# Patient Record
Sex: FEMALE | Race: BLACK OR AFRICAN AMERICAN | NOT HISPANIC OR LATINO | URBAN - METROPOLITAN AREA
[De-identification: names, ages, dates, MRNs, and addresses within clinical notes are randomized per-mention and may not be internally consistent; named-entity substitution may affect disease eponyms.]

---

## 2018-07-19 ENCOUNTER — EMERGENCY (EMERGENCY)
Facility: HOSPITAL | Age: 23
LOS: 1 days | Discharge: HOME | End: 2018-07-19
Attending: EMERGENCY MEDICINE
Payer: MEDICAID

## 2018-07-19 VITALS
HEIGHT: 70 IN | SYSTOLIC BLOOD PRESSURE: 116 MMHG | WEIGHT: 138.01 LBS | OXYGEN SATURATION: 100 % | TEMPERATURE: 98 F | RESPIRATION RATE: 17 BRPM | DIASTOLIC BLOOD PRESSURE: 79 MMHG | HEART RATE: 77 BPM

## 2018-07-19 DIAGNOSIS — N83.201 UNSPECIFIED OVARIAN CYST, RIGHT SIDE: ICD-10-CM

## 2018-07-19 LAB
ANION GAP SERPL CALC-SCNC: 5 MMOL/L — SIGNIFICANT CHANGE UP (ref 5–17)
APPEARANCE UR: CLEAR — SIGNIFICANT CHANGE UP
BASOPHILS # BLD AUTO: 0.1 K/UL — SIGNIFICANT CHANGE UP (ref 0–0.2)
BASOPHILS NFR BLD AUTO: 1.3 % — SIGNIFICANT CHANGE UP (ref 0–2)
BILIRUB UR-MCNC: NEGATIVE — SIGNIFICANT CHANGE UP
BUN SERPL-MCNC: 11 MG/DL — SIGNIFICANT CHANGE UP (ref 7–18)
CALCIUM SERPL-MCNC: 9.1 MG/DL — SIGNIFICANT CHANGE UP (ref 8.4–10.5)
CHLORIDE SERPL-SCNC: 107 MMOL/L — SIGNIFICANT CHANGE UP (ref 96–108)
CO2 SERPL-SCNC: 26 MMOL/L — SIGNIFICANT CHANGE UP (ref 22–31)
COLOR SPEC: YELLOW — SIGNIFICANT CHANGE UP
CREAT SERPL-MCNC: 0.78 MG/DL — SIGNIFICANT CHANGE UP (ref 0.5–1.3)
DIFF PNL FLD: ABNORMAL
EOSINOPHIL # BLD AUTO: 0.2 K/UL — SIGNIFICANT CHANGE UP (ref 0–0.5)
EOSINOPHIL NFR BLD AUTO: 1.8 % — SIGNIFICANT CHANGE UP (ref 0–6)
GLUCOSE SERPL-MCNC: 88 MG/DL — SIGNIFICANT CHANGE UP (ref 70–99)
GLUCOSE UR QL: NEGATIVE — SIGNIFICANT CHANGE UP
HCG UR QL: NEGATIVE — SIGNIFICANT CHANGE UP
HCT VFR BLD CALC: 43.6 % — SIGNIFICANT CHANGE UP (ref 34.5–45)
HGB BLD-MCNC: 13.7 G/DL — SIGNIFICANT CHANGE UP (ref 11.5–15.5)
KETONES UR-MCNC: ABNORMAL
LACTATE SERPL-SCNC: 1.7 MMOL/L — SIGNIFICANT CHANGE UP (ref 0.7–2)
LEUKOCYTE ESTERASE UR-ACNC: ABNORMAL
LYMPHOCYTES # BLD AUTO: 2.9 K/UL — SIGNIFICANT CHANGE UP (ref 1–3.3)
LYMPHOCYTES # BLD AUTO: 35 % — SIGNIFICANT CHANGE UP (ref 13–44)
MCHC RBC-ENTMCNC: 25.2 PG — LOW (ref 27–34)
MCHC RBC-ENTMCNC: 31.4 GM/DL — LOW (ref 32–36)
MCV RBC AUTO: 80.3 FL — SIGNIFICANT CHANGE UP (ref 80–100)
MONOCYTES # BLD AUTO: 0.4 K/UL — SIGNIFICANT CHANGE UP (ref 0–0.9)
MONOCYTES NFR BLD AUTO: 5.3 % — SIGNIFICANT CHANGE UP (ref 2–14)
NEUTROPHILS # BLD AUTO: 4.7 K/UL — SIGNIFICANT CHANGE UP (ref 1.8–7.4)
NEUTROPHILS NFR BLD AUTO: 56.6 % — SIGNIFICANT CHANGE UP (ref 43–77)
NITRITE UR-MCNC: NEGATIVE — SIGNIFICANT CHANGE UP
PH UR: 7 — SIGNIFICANT CHANGE UP (ref 5–8)
PLATELET # BLD AUTO: 294 K/UL — SIGNIFICANT CHANGE UP (ref 150–400)
POTASSIUM SERPL-MCNC: 3.8 MMOL/L — SIGNIFICANT CHANGE UP (ref 3.5–5.3)
POTASSIUM SERPL-SCNC: 3.8 MMOL/L — SIGNIFICANT CHANGE UP (ref 3.5–5.3)
PROT UR-MCNC: 15
RBC # BLD: 5.44 M/UL — HIGH (ref 3.8–5.2)
RBC # FLD: 13.5 % — SIGNIFICANT CHANGE UP (ref 10.3–14.5)
SODIUM SERPL-SCNC: 138 MMOL/L — SIGNIFICANT CHANGE UP (ref 135–145)
SP GR SPEC: 1.01 — SIGNIFICANT CHANGE UP (ref 1.01–1.02)
UROBILINOGEN FLD QL: NEGATIVE — SIGNIFICANT CHANGE UP
WBC # BLD: 8.4 K/UL — SIGNIFICANT CHANGE UP (ref 3.8–10.5)
WBC # FLD AUTO: 8.4 K/UL — SIGNIFICANT CHANGE UP (ref 3.8–10.5)

## 2018-07-19 PROCEDURE — 76830 TRANSVAGINAL US NON-OB: CPT | Mod: 26

## 2018-07-19 PROCEDURE — 99285 EMERGENCY DEPT VISIT HI MDM: CPT

## 2018-07-19 PROCEDURE — 76856 US EXAM PELVIC COMPLETE: CPT | Mod: 26

## 2018-07-19 PROCEDURE — 99204 OFFICE O/P NEW MOD 45 MIN: CPT

## 2018-07-19 PROCEDURE — 96374 THER/PROPH/DIAG INJ IV PUSH: CPT | Mod: XU

## 2018-07-19 PROCEDURE — 96372 THER/PROPH/DIAG INJ SC/IM: CPT | Mod: XU

## 2018-07-19 PROCEDURE — 74177 CT ABD & PELVIS W/CONTRAST: CPT | Mod: 26

## 2018-07-19 PROCEDURE — 81025 URINE PREGNANCY TEST: CPT

## 2018-07-19 PROCEDURE — 83605 ASSAY OF LACTIC ACID: CPT

## 2018-07-19 PROCEDURE — 81001 URINALYSIS AUTO W/SCOPE: CPT

## 2018-07-19 PROCEDURE — 84702 CHORIONIC GONADOTROPIN TEST: CPT

## 2018-07-19 PROCEDURE — 87591 N.GONORRHOEAE DNA AMP PROB: CPT

## 2018-07-19 PROCEDURE — 87491 CHLMYD TRACH DNA AMP PROBE: CPT

## 2018-07-19 PROCEDURE — 76856 US EXAM PELVIC COMPLETE: CPT

## 2018-07-19 PROCEDURE — 99284 EMERGENCY DEPT VISIT MOD MDM: CPT | Mod: 25

## 2018-07-19 PROCEDURE — 80048 BASIC METABOLIC PNL TOTAL CA: CPT

## 2018-07-19 PROCEDURE — 74177 CT ABD & PELVIS W/CONTRAST: CPT

## 2018-07-19 PROCEDURE — 76830 TRANSVAGINAL US NON-OB: CPT

## 2018-07-19 PROCEDURE — 85027 COMPLETE CBC AUTOMATED: CPT

## 2018-07-19 RX ORDER — ONDANSETRON 8 MG/1
4 TABLET, FILM COATED ORAL ONCE
Qty: 0 | Refills: 0 | Status: COMPLETED | OUTPATIENT
Start: 2018-07-19 | End: 2018-07-19

## 2018-07-19 RX ORDER — SODIUM CHLORIDE 9 MG/ML
1000 INJECTION INTRAMUSCULAR; INTRAVENOUS; SUBCUTANEOUS ONCE
Qty: 0 | Refills: 0 | Status: COMPLETED | OUTPATIENT
Start: 2018-07-19 | End: 2018-07-19

## 2018-07-19 RX ORDER — OXYCODONE HYDROCHLORIDE 5 MG/1
1 TABLET ORAL
Qty: 6 | Refills: 0 | OUTPATIENT
Start: 2018-07-19

## 2018-07-19 RX ORDER — CEFTRIAXONE 500 MG/1
250 INJECTION, POWDER, FOR SOLUTION INTRAMUSCULAR; INTRAVENOUS ONCE
Qty: 0 | Refills: 0 | Status: COMPLETED | OUTPATIENT
Start: 2018-07-19 | End: 2018-07-19

## 2018-07-19 RX ADMIN — CEFTRIAXONE 250 MILLIGRAM(S): 500 INJECTION, POWDER, FOR SOLUTION INTRAMUSCULAR; INTRAVENOUS at 18:29

## 2018-07-19 RX ADMIN — ONDANSETRON 4 MILLIGRAM(S): 8 TABLET, FILM COATED ORAL at 13:42

## 2018-07-19 RX ADMIN — SODIUM CHLORIDE 1000 MILLILITER(S): 9 INJECTION INTRAMUSCULAR; INTRAVENOUS; SUBCUTANEOUS at 13:42

## 2018-07-19 RX ADMIN — SODIUM CHLORIDE 1000 MILLILITER(S): 9 INJECTION INTRAMUSCULAR; INTRAVENOUS; SUBCUTANEOUS at 18:45

## 2018-07-19 RX ADMIN — Medication 100 MILLIGRAM(S): at 18:28

## 2018-07-19 NOTE — ED PROVIDER NOTE - MEDICAL DECISION MAKING DETAILS
23 yo female presenting with abdominal pain, N/V. Patient had episode of emesis shortly after arrival. Abdomen demonstrates some RLQ tenderness without peritoneal signs. Plan for cbc, bmp, lactate, U/A, pregnancy test. Will provide hydration and antiemetics.

## 2018-07-19 NOTE — ED PROVIDER NOTE - PROGRESS NOTE DETAILS
Patient reassessed upon return from CT. She states her pain and her nausea has significantly improved. Patient unable to provide urine sample as she urinated earlier. Will give additional fluids and antiemetics. Spoke with radiologist who states patient's appendix appears normal, but the R ovary is enlarged relative to the left. Patient awaiting transvaginal US to rule out torsion. We will contact OBGYN regarding concern for torsion. Patient signed out to me by Dr. Negrete. Awaiting result of pelvic ultrasound and gyn consult. Patient seen by gyn PA, who will d/w her attending and get back to me. Patient signed out to me by Dr. Negrete. Awaiting result of pelvic ultrasound and gyn consult. Wants patient to get tx for PID. Spoke with gyn SIMON Billy, who d/w Dr. Mauro. They doubt patient has PID but are not opposed to tx. To f/u in gyn clinic in 1 week. Return to the ED immediately if getting worse, not improving, or if having any new or troubling symptoms. Patient resting comfortably, feels moderately improved.

## 2018-07-19 NOTE — CONSULT NOTE ADULT - PROBLEM SELECTOR RECOMMENDATION 9
Cyst is 2.8 cm, stable, VSS - no acute gyn intervention needed at this time  Discharge home with pain meds for 48 hours  patient to follow up at [facility] ([phone]) next week  at this time no need for antibiotics, to follow up in [facility]

## 2018-07-19 NOTE — ED PROVIDER NOTE - OBJECTIVE STATEMENT
23 yo female presenting with 1-2 day history of RLQ abdominal pain, N/V and loose stools. Patient states she is currently on her menses and is having sharp lower abdominal pain. She states she became diaphoretic and nauseous earlier today while getting a haircut. She states she feels generally weak and does not wish to ambulate. She states that a similar pain has happened before during her menses, which resolved. She denies chest pain, SOB, Fevers, chills. She denies headache. She denies vaginal discharge, flank pain, urinary frequency, urgency. Is sexually active.

## 2018-07-19 NOTE — ED PROVIDER NOTE - CERVIX
non tender/ERYTHEMA non tender/ERYTHEMA/No evidence of cervical motion tenderness or cervical friability.

## 2018-07-19 NOTE — CONSULT NOTE ADULT - ASSESSMENT
21 y/o F with small Right ovarian cyst, suspected ruptured cyst following intercourse, currently stable

## 2018-07-19 NOTE — CONSULT NOTE ADULT - SUBJECTIVE AND OBJECTIVE BOX
22y F g0 (LMP ) presents to ED complaining of Right sided abdominal pain that was sudden in onset after intercourse.  Pain is non radiating and continuous, since arriving to ED patient states pain has subsided. Patient also reporting nausea and vomiting and decreased appetite. As per patient she is aware of having a small ovarian cyst on the right side. Denies any fever, chills, constipation/diarrhea, epigastric pain, or  heavy vaginal bleeding.    OB/GYN HISTORY: LMP , hx of chlamydia at age 17 treated; patient currently sexually active with one partner, last std check was 2 months ago which was negative. Was treating yest infection for the last week with monistat  Pmhx: denies  Pshx: denies  ROS negative   Allergies: nkda   meds: denies taking any meds    Vital Signs Last 24 Hrs  T(C): 36.9 (2018 12:49), Max: 36.9 (2018 12:49)  T(F): 98.5 (2018 12:49), Max: 98.5 (2018 12:49)  HR: 77 (2018 12:49) (77 - 77)  BP: 116/79 (2018 12:49) (116/79 - 116/79)  RR: 17 (2018 12:49) (17 - 17)  SpO2: 100% (2018 12:49) (100% - 100%)    PHYSICAL EXAM:  Gen: NAD, a&ox3  Abd: mild amount of Right sided abdominal pain on deep palpation, no guarding, no rebound, neg psoas sign, neg mcburneys tenderness  gyn (performed by ED attending) as per doctor some vaginal discharge, however no cultures collected    LABS:                        13.7   8.4   )-----------( 294      ( 2018 13:33 )             43.6     07-19    138  |  107  |  11  ----------------------------<  88  3.8   |  26  |  0.78    Ca    9.1      2018 13:33      Urinalysis Basic - ( 2018 16:33 )    Color: Yellow / Appearance: Clear / S.010 / pH: x  Gluc: x / Ketone: Trace  / Bili: Negative / Urobili: Negative   Blood: x / Protein: 15 / Nitrite: Negative   Leuk Esterase: Trace / RBC: 0-2 /HPF / WBC 0-2 /HPF   Sq Epi: x / Non Sq Epi: Few /HPF / Bacteria: Trace /HPF    Pregnancy Profile, Urine (18 @ 16:33)    Pregnancy Profile, Urine: Negative    HCG Quantitative, Serum (18 @ 13:33)    HCG Quantitative, Serum: <1:      RADIOLOGY & ADDITIONAL STUDIES:  EXAM:  US TRANSVAGINAL                        EXAM:  US PELVIC COMPLETE                          PROCEDURE DATE:  2018      INTERPRETATION:  CLINICAL INFORMATION: Right lower quadrant abdominal   pain.    LMP: 2018    COMPARISON: Correlation with CT abdomen/pelvis performed on the same day.    TECHNIQUE:     Transabdominal and Endovaginal pelvic sonogram. Color and Spectral   Doppler was performed.    FINDINGS:    Uterus: 8.3 x 4.0 x 5.2 cm. Within normal limits.    Endometrium: 6 mm. Within normal limits.    Right ovary: 4.1 x 3.4 x 3.4 cm., inclusive of a 2.8 x 1.8 x 2.4 cm   complex cyst. Ovarian blood flow is demonstrated utilizing color and   spectral Doppler.    Left ovary: 3.4 x 1.3 x 1.6 cm. Within normal limits. Over blood flow is   demonstrated utilizing color and spectral Doppler.    Fluid: Free fluid within the right adnexa and in the cul-de-sac.    IMPRESSION:    2.8 cm complex right ovarian cyst.    Small free fluid adjacent to the right adnexa and within the cul-de-sac.

## 2018-07-20 LAB
C TRACH RRNA SPEC QL NAA+PROBE: SIGNIFICANT CHANGE UP
N GONORRHOEA RRNA SPEC QL NAA+PROBE: SIGNIFICANT CHANGE UP
SPECIMEN SOURCE: SIGNIFICANT CHANGE UP

## 2019-07-18 ENCOUNTER — EMERGENCY (EMERGENCY)
Facility: HOSPITAL | Age: 24
LOS: 1 days | Discharge: HOME | End: 2019-07-18
Attending: EMERGENCY MEDICINE | Admitting: EMERGENCY MEDICINE
Payer: COMMERCIAL

## 2019-07-18 VITALS
RESPIRATION RATE: 18 BRPM | DIASTOLIC BLOOD PRESSURE: 73 MMHG | OXYGEN SATURATION: 99 % | TEMPERATURE: 98 F | HEART RATE: 70 BPM | SYSTOLIC BLOOD PRESSURE: 118 MMHG | HEIGHT: 69 IN | WEIGHT: 130.07 LBS

## 2019-07-18 DIAGNOSIS — J34.89 OTHER SPECIFIED DISORDERS OF NOSE AND NASAL SINUSES: ICD-10-CM

## 2019-07-18 PROCEDURE — 99283 EMERGENCY DEPT VISIT LOW MDM: CPT | Mod: 25

## 2019-07-18 NOTE — ED ADULT TRIAGE NOTE - CHIEF COMPLAINT QUOTE
pt. c/o feeling swelling and congestion to the back of her nose, states she has polyps there, headaches for "a few weeks". denies cough, fever.

## 2019-07-18 NOTE — ED ADULT NURSE NOTE - OBJECTIVE STATEMENT
Patient came in to ED c/o nasal congestion/pain x 1 week. Per patient she may have "nasal polyps" and has been taking zyrtec with no relief.

## 2019-07-18 NOTE — ED ADULT NURSE NOTE - CHPI ED NUR SYMPTOMS NEG
no nausea/no numbness/no vomiting/no syncope/no chills/no weakness/no fever/no bleeding gums/no loss of consciousness Patient Representative

## 2019-07-18 NOTE — ED ADULT NURSE NOTE - NSIMPLEMENTINTERV_GEN_ALL_ED
Implemented All Universal Safety Interventions:  [place] to call system. Call bell, personal items and telephone within reach. Instruct patient to call for assistance. Room bathroom lighting operational. Non-slip footwear when patient is off stretcher. Physically safe environment: no spills, clutter or unnecessary equipment. Stretcher in lowest position, wheels locked, appropriate side rails in place.

## 2019-07-19 VITALS
TEMPERATURE: 97 F | RESPIRATION RATE: 16 BRPM | DIASTOLIC BLOOD PRESSURE: 74 MMHG | HEART RATE: 71 BPM | SYSTOLIC BLOOD PRESSURE: 108 MMHG | OXYGEN SATURATION: 98 %

## 2019-07-19 PROCEDURE — 99283 EMERGENCY DEPT VISIT LOW MDM: CPT

## 2019-07-19 RX ORDER — PSEUDOEPHEDRINE HCL 30 MG
30 TABLET ORAL ONCE
Refills: 0 | Status: COMPLETED | OUTPATIENT
Start: 2019-07-19 | End: 2019-07-19

## 2019-07-19 RX ADMIN — Medication 1 TABLET(S): at 02:29

## 2019-07-19 RX ADMIN — Medication 500 MILLIGRAM(S): at 02:29

## 2019-07-19 RX ADMIN — Medication 30 MILLIGRAM(S): at 02:29

## 2019-07-19 NOTE — ED PROVIDER NOTE - NSFOLLOWUPINSTRUCTIONS_ED_ALL_ED_FT
Sinusitis is soreness and inflammation of your sinuses. Sinuses are hollow spaces in the bones around your face. Your sinuses are located:  Around your eyes.  In the middle of your forehead.  Behind your nose.  In your cheekbones.  Your sinuses and nasal passages are lined with a stringy fluid (mucus). Mucus normally drains out of your sinuses. When your nasal tissues become inflamed or swollen, mucus can become trapped or blocked. Blocked or trapped mucus makes it difficult for air to flow through your sinuses. This allows bacteria, viruses, and funguses to grow, which leads to infection. Most infections of the sinuses are caused by a virus.    Sinusitis can develop quickly. It can last for 7?10 days (acute) or for more than 12 weeks (chronic). Sinusitis often develops after a cold.    What are the causes?  This condition is caused by anything that creates swelling in the sinuses or stops mucus from draining, including:  Allergies.  Asthma.  Bacterial or viral infection.  Abnormally shaped bones between the nasal passages.  Nasal growths that contain mucus (nasal polyps).  Narrow sinus openings.  Pollutants, such as chemicals or irritants in the air.  A foreign object stuck in the nose.  A fungal infection. This is rare.  What increases the risk?  The following factors may make you more likely to develop this condition:  Having allergies or asthma.  Having had a recent cold or respiratory tract infection.  Having structural deformities or blockages in your nose or sinuses.  Having a weak immune system.  Doing a lot of swimming or diving.  Overusing nasal sprays.  Smoking.  What are the signs or symptoms?  The main symptoms of this condition are pain and a feeling of pressure around the affected sinuses. Other symptoms include:  Upper toothache.  Earache.  Headache.  Bad breath.  Decreased sense of smell and taste.  A cough that may get worse at night.  Fatigue.  Fever.  Thick drainage from your nose. The drainage is often green and it may contain pus (purulent).  Stuffy nose or congestion.  Postnasal drip. This is when extra mucus collects in the throat or back of the nose.  Swelling and warmth over the affected sinuses.  Sore throat.  Sensitivity to light.  How is this diagnosed?  This condition is diagnosed based on symptoms, a medical history, and a physical exam. To find out if your condition is acute or chronic, your health care provider may:  Look in your nose for signs of nasal polyps.  Tap over the affected sinus to check for signs of infection.  View the inside of your sinuses using an imaging device that has a light attached (endoscope).  If your health care provider suspects that you have chronic sinusitis, you may also:  Be tested for allergies.  Have a sample of mucus taken from your nose (nasal culture) and checked for bacteria.  Have a mucus sample examined to see if your sinusitis is related to an allergy.  If your sinusitis does not respond to treatment and it lasts longer than 8 weeks, you may have an MRI or CT scan to check your sinuses. These scans also help to determine how severe your infection is.    In rare cases, a bone biopsy may be done to rule out more serious types of fungal sinus disease.    How is this treated?  Treatment for sinusitis depends on the cause and whether your condition is chronic or acute. If a virus is causing your sinusitis, your symptoms will go away on their own within 10 days. You may be given medicines to relieve your symptoms, including:  Topical nasal decongestants. They shrink swollen nasal passages and let mucus drain from your sinuses.  Antihistamines. These drugs block inflammation that is triggered by allergies. This can help to ease swelling in your nose and sinuses.  Topical nasal corticosteroids. These are nasal sprays that ease inflammation and swelling in your nose and sinuses.  Nasal saline washes. These rinses can help to get rid of thick mucus in your nose.  If your condition is caused by bacteria, your health care provider may recommend waiting to see if your symptoms improve. Most bacterial infections will get better without antibiotic medicine. If you have a severe infection or a weak immune system, you may be prescribed antibiotics.    Surgery may be needed to correct underlying conditions, such as narrow nasal passages. Surgery may also be needed to remove polyps.    Follow these instructions at home:  Medicines     Take, use, or apply over-the-counter and prescription medicines only as told by your health care provider. These may include nasal sprays.  If you were prescribed an antibiotic, take it as told by your health care provider. Do not stop taking the antibiotic even if you start to feel better.  Hydrate and Humidify     Drink enough water to keep your urine clear or pale yellow. Staying hydrated will help to thin your mucus.  Use a cool mist humidifier to keep the humidity level in your home above 50%.  Inhale steam for 10–15 minutes, 3–4 times a day or as told by your health care provider. You can do this in the bathroom while a hot shower is running.  Limit your exposure to cool or dry air.  Rest     Rest as much as possible.  Sleep with your head raised (elevated).  Make sure to get enough sleep each night.  General instructions     Apply a warm, moist washcloth to your face 3–4 times a day or as told by your health care provider. This will help with discomfort.  Wash your hands often with soap and water to reduce your exposure to viruses and other germs. If soap and water are not available, use hand .  Do not smoke. Avoid being around people who are smoking (secondhand smoke).  Keep all follow-up visits as told by your health care provider. This is important.  Contact a health care provider if:  You have a fever.  Your symptoms get worse.  Your symptoms do not improve within 10 days.  Get help right away if:  You have a severe headache.  You have persistent vomiting.  You have pain or swelling around your face or eyes.  You have vision problems.  You develop confusion.  Your neck is stiff.  You have trouble breathing.  This information is not intended to replace advice given to you by your health care provider. Make sure you discuss any questions you have with your health care provider.

## 2019-07-19 NOTE — ED PROVIDER NOTE - OBJECTIVE STATEMENT
23F no PMH c/o sinus congestion. pt states feels a lot of sinus congestion over past few weeks. states foul smelling nasal discharge when she blows her nose.  no sore throat.  pressure to R ear. states has tried OTC sinus relief medications without relief.  no fevers. no HA. no numbness/weakness. no cough. no sick contacts.

## 2019-07-19 NOTE — ED PROVIDER NOTE - NSFOLLOWUPCLINICS_GEN_ALL_ED_FT
[facility]  Otolaryngology (ENT)  [address] [place] [address]  Phone: [phone]  Fax: [phone]  Follow Up Time:

## 2019-07-19 NOTE — ED PROVIDER NOTE - ENMT, MLM
Airway patent, Nasal mucosa clear. Mouth with normal mucosa. Throat has no vesicles, no oropharyngeal exudates and uvula is midline.  TMs clear b/l, no stridor, no trimsus, no intraoral swelling.  +maxillary sinus TTP b/l. no mastoid tenderness

## 2019-07-19 NOTE — ED PROVIDER NOTE - CLINICAL SUMMARY MEDICAL DECISION MAKING FREE TEXT BOX
sinus pressure/pain, ear pressure, no evidence of OM, likely sinusitis, no airway compromise  -augmentin, naproxen  -f/u with ENT  I have discussed the discharge plan with the patient. The patient agrees with the plan, as discussed.  The patient understands Emergency Department diagnosis is a preliminary diagnosis often based on limited information and that the patient must adhere to the follow-up plan as discussed.  The patient understands that if the symptoms worsen or if prescribed medications do not have the desired/planned effect that the patient may return to the Emergency Department at any time for further evaluation and treatment.

## 2020-11-20 ENCOUNTER — EMERGENCY (EMERGENCY)
Facility: HOSPITAL | Age: 25
LOS: 1 days | Discharge: HOME | End: 2020-11-20
Attending: EMERGENCY MEDICINE | Admitting: EMERGENCY MEDICINE
Payer: COMMERCIAL

## 2020-11-20 VITALS
OXYGEN SATURATION: 100 % | HEIGHT: 69 IN | SYSTOLIC BLOOD PRESSURE: 99 MMHG | RESPIRATION RATE: 16 BRPM | WEIGHT: 160.06 LBS | TEMPERATURE: 99 F | HEART RATE: 81 BPM | DIASTOLIC BLOOD PRESSURE: 63 MMHG

## 2020-11-20 VITALS
DIASTOLIC BLOOD PRESSURE: 69 MMHG | TEMPERATURE: 99 F | OXYGEN SATURATION: 98 % | SYSTOLIC BLOOD PRESSURE: 109 MMHG | HEART RATE: 74 BPM | RESPIRATION RATE: 18 BRPM

## 2020-11-20 DIAGNOSIS — Z79.891 LONG TERM (CURRENT) USE OF OPIATE ANALGESIC: ICD-10-CM

## 2020-11-20 DIAGNOSIS — O20.9 HEMORRHAGE IN EARLY PREGNANCY, UNSPECIFIED: ICD-10-CM

## 2020-11-20 DIAGNOSIS — Z3A.01 LESS THAN 8 WEEKS GESTATION OF PREGNANCY: ICD-10-CM

## 2020-11-20 DIAGNOSIS — Z79.1 LONG TERM (CURRENT) USE OF NON-STEROIDAL ANTI-INFLAMMATORIES (NSAID): ICD-10-CM

## 2020-11-20 DIAGNOSIS — Z79.2 LONG TERM (CURRENT) USE OF ANTIBIOTICS: ICD-10-CM

## 2020-11-20 LAB
ALBUMIN SERPL ELPH-MCNC: 4.1 G/DL — SIGNIFICANT CHANGE UP (ref 3.3–5)
ALP SERPL-CCNC: 54 U/L — SIGNIFICANT CHANGE UP (ref 40–120)
ALT FLD-CCNC: 12 U/L — SIGNIFICANT CHANGE UP (ref 10–45)
ANION GAP SERPL CALC-SCNC: 11 MMOL/L — SIGNIFICANT CHANGE UP (ref 5–17)
APPEARANCE UR: CLEAR — SIGNIFICANT CHANGE UP
AST SERPL-CCNC: 15 U/L — SIGNIFICANT CHANGE UP (ref 10–40)
BACTERIA # UR AUTO: ABNORMAL /HPF
BASOPHILS # BLD AUTO: 0.03 K/UL — SIGNIFICANT CHANGE UP (ref 0–0.2)
BASOPHILS NFR BLD AUTO: 0.3 % — SIGNIFICANT CHANGE UP (ref 0–2)
BILIRUB SERPL-MCNC: 0.6 MG/DL — SIGNIFICANT CHANGE UP (ref 0.2–1.2)
BILIRUB UR-MCNC: ABNORMAL
BLD GP AB SCN SERPL QL: NEGATIVE — SIGNIFICANT CHANGE UP
BUN SERPL-MCNC: 14 MG/DL — SIGNIFICANT CHANGE UP (ref 7–23)
CALCIUM SERPL-MCNC: 9.1 MG/DL — SIGNIFICANT CHANGE UP (ref 8.4–10.5)
CHLORIDE SERPL-SCNC: 101 MMOL/L — SIGNIFICANT CHANGE UP (ref 96–108)
CO2 SERPL-SCNC: 22 MMOL/L — SIGNIFICANT CHANGE UP (ref 22–31)
COLOR SPEC: ABNORMAL
COMMENT - URINE: SIGNIFICANT CHANGE UP
CREAT SERPL-MCNC: 0.75 MG/DL — SIGNIFICANT CHANGE UP (ref 0.5–1.3)
DIFF PNL FLD: ABNORMAL
EOSINOPHIL # BLD AUTO: 0.1 K/UL — SIGNIFICANT CHANGE UP (ref 0–0.5)
EOSINOPHIL NFR BLD AUTO: 1.1 % — SIGNIFICANT CHANGE UP (ref 0–6)
EPI CELLS # UR: SIGNIFICANT CHANGE UP /HPF (ref 0–5)
GLUCOSE SERPL-MCNC: 88 MG/DL — SIGNIFICANT CHANGE UP (ref 70–99)
GLUCOSE UR QL: 100
HCG SERPL-ACNC: HIGH MIU/ML
HCT VFR BLD CALC: 37.1 % — SIGNIFICANT CHANGE UP (ref 34.5–45)
HGB BLD-MCNC: 11.6 G/DL — SIGNIFICANT CHANGE UP (ref 11.5–15.5)
IMM GRANULOCYTES NFR BLD AUTO: 0.3 % — SIGNIFICANT CHANGE UP (ref 0–1.5)
KETONES UR-MCNC: ABNORMAL MG/DL
LEUKOCYTE ESTERASE UR-ACNC: NEGATIVE — SIGNIFICANT CHANGE UP
LYMPHOCYTES # BLD AUTO: 1.94 K/UL — SIGNIFICANT CHANGE UP (ref 1–3.3)
LYMPHOCYTES # BLD AUTO: 21.7 % — SIGNIFICANT CHANGE UP (ref 13–44)
MCHC RBC-ENTMCNC: 24.2 PG — LOW (ref 27–34)
MCHC RBC-ENTMCNC: 31.3 GM/DL — LOW (ref 32–36)
MCV RBC AUTO: 77.3 FL — LOW (ref 80–100)
MONOCYTES # BLD AUTO: 0.54 K/UL — SIGNIFICANT CHANGE UP (ref 0–0.9)
MONOCYTES NFR BLD AUTO: 6 % — SIGNIFICANT CHANGE UP (ref 2–14)
NEUTROPHILS # BLD AUTO: 6.32 K/UL — SIGNIFICANT CHANGE UP (ref 1.8–7.4)
NEUTROPHILS NFR BLD AUTO: 70.6 % — SIGNIFICANT CHANGE UP (ref 43–77)
NITRITE UR-MCNC: POSITIVE
NRBC # BLD: 0 /100 WBCS — SIGNIFICANT CHANGE UP (ref 0–0)
PH UR: 6.5 — SIGNIFICANT CHANGE UP (ref 5–8)
PLATELET # BLD AUTO: 317 K/UL — SIGNIFICANT CHANGE UP (ref 150–400)
POTASSIUM SERPL-MCNC: 4.3 MMOL/L — SIGNIFICANT CHANGE UP (ref 3.5–5.3)
POTASSIUM SERPL-SCNC: 4.3 MMOL/L — SIGNIFICANT CHANGE UP (ref 3.5–5.3)
PROT SERPL-MCNC: 7 G/DL — SIGNIFICANT CHANGE UP (ref 6–8.3)
PROT UR-MCNC: >=300 MG/DL
RBC # BLD: 4.8 M/UL — SIGNIFICANT CHANGE UP (ref 3.8–5.2)
RBC # FLD: 14.6 % — HIGH (ref 10.3–14.5)
RBC CASTS # UR COMP ASSIST: ABNORMAL /HPF
RH IG SCN BLD-IMP: POSITIVE — SIGNIFICANT CHANGE UP
SODIUM SERPL-SCNC: 134 MMOL/L — LOW (ref 135–145)
SP GR SPEC: >=1.03 — SIGNIFICANT CHANGE UP (ref 1–1.03)
UROBILINOGEN FLD QL: 1 E.U./DL — SIGNIFICANT CHANGE UP
WBC # BLD: 8.96 K/UL — SIGNIFICANT CHANGE UP (ref 3.8–10.5)
WBC # FLD AUTO: 8.96 K/UL — SIGNIFICANT CHANGE UP (ref 3.8–10.5)
WBC UR QL: < 5 /HPF — SIGNIFICANT CHANGE UP

## 2020-11-20 PROCEDURE — 86901 BLOOD TYPING SEROLOGIC RH(D): CPT

## 2020-11-20 PROCEDURE — 87184 SC STD DISK METHOD PER PLATE: CPT

## 2020-11-20 PROCEDURE — 99284 EMERGENCY DEPT VISIT MOD MDM: CPT | Mod: 25

## 2020-11-20 PROCEDURE — 87086 URINE CULTURE/COLONY COUNT: CPT

## 2020-11-20 PROCEDURE — 96361 HYDRATE IV INFUSION ADD-ON: CPT

## 2020-11-20 PROCEDURE — 76817 TRANSVAGINAL US OBSTETRIC: CPT

## 2020-11-20 PROCEDURE — 36415 COLL VENOUS BLD VENIPUNCTURE: CPT

## 2020-11-20 PROCEDURE — 86850 RBC ANTIBODY SCREEN: CPT

## 2020-11-20 PROCEDURE — 80053 COMPREHEN METABOLIC PANEL: CPT

## 2020-11-20 PROCEDURE — 81001 URINALYSIS AUTO W/SCOPE: CPT

## 2020-11-20 PROCEDURE — 96374 THER/PROPH/DIAG INJ IV PUSH: CPT

## 2020-11-20 PROCEDURE — 85025 COMPLETE CBC W/AUTO DIFF WBC: CPT

## 2020-11-20 PROCEDURE — 76801 OB US < 14 WKS SINGLE FETUS: CPT

## 2020-11-20 PROCEDURE — 99285 EMERGENCY DEPT VISIT HI MDM: CPT

## 2020-11-20 PROCEDURE — 96375 TX/PRO/DX INJ NEW DRUG ADDON: CPT

## 2020-11-20 PROCEDURE — 76801 OB US < 14 WKS SINGLE FETUS: CPT | Mod: 26

## 2020-11-20 PROCEDURE — 84702 CHORIONIC GONADOTROPIN TEST: CPT

## 2020-11-20 PROCEDURE — 76817 TRANSVAGINAL US OBSTETRIC: CPT | Mod: 26

## 2020-11-20 PROCEDURE — 86900 BLOOD TYPING SEROLOGIC ABO: CPT

## 2020-11-20 RX ORDER — KETOROLAC TROMETHAMINE 30 MG/ML
30 SYRINGE (ML) INJECTION ONCE
Refills: 0 | Status: DISCONTINUED | OUTPATIENT
Start: 2020-11-20 | End: 2020-11-20

## 2020-11-20 RX ORDER — ONDANSETRON 8 MG/1
4 TABLET, FILM COATED ORAL ONCE
Refills: 0 | Status: COMPLETED | OUTPATIENT
Start: 2020-11-20 | End: 2020-11-20

## 2020-11-20 RX ORDER — SODIUM CHLORIDE 9 MG/ML
1000 INJECTION INTRAMUSCULAR; INTRAVENOUS; SUBCUTANEOUS ONCE
Refills: 0 | Status: COMPLETED | OUTPATIENT
Start: 2020-11-20 | End: 2020-11-20

## 2020-11-20 RX ADMIN — SODIUM CHLORIDE 1000 MILLILITER(S): 9 INJECTION INTRAMUSCULAR; INTRAVENOUS; SUBCUTANEOUS at 14:40

## 2020-11-20 RX ADMIN — ONDANSETRON 4 MILLIGRAM(S): 8 TABLET, FILM COATED ORAL at 14:40

## 2020-11-20 RX ADMIN — Medication 30 MILLIGRAM(S): at 16:29

## 2020-11-20 RX ADMIN — SODIUM CHLORIDE 1000 MILLILITER(S): 9 INJECTION INTRAMUSCULAR; INTRAVENOUS; SUBCUTANEOUS at 15:39

## 2020-11-20 RX ADMIN — Medication 30 MILLIGRAM(S): at 17:00

## 2020-11-20 NOTE — ED ADULT NURSE NOTE - OBJECTIVE STATEMENT
LMP Oct. , 6 wks pregnant, c/o of abdominal cramping pain w/ episodes of nausea and vomitting, vaginal bleed started 1 hour ago. [facility] and [facility]stered [facility]ol.

## 2020-11-20 NOTE — ED ADULT NURSE REASSESSMENT NOTE - NS ED NURSE REASSESS COMMENT FT1
Patient a/ox 3, symptoms improved, no c/o of abdominal cramping pain, vital sgins stable.  All labs, US resulted.  Seen by OB GYNE.  Discharged to home in stable condition.

## 2020-11-20 NOTE — ED PROVIDER NOTE - OBJECTIVE STATEMENT
25 F  LMP 10/7 p/w vaginal bleeding and pelvic cramping s/p medical  (unknown pill) yesterday given at [facility].  pt reports she h 25 F  LMP 10/7 approx 7 weeks preg p/w vaginal bleeding and pelvic cramping s/p medical  (unknown pill) yesterday given at [facility].  pt reports she had bedside sonogram yesterday confirming IUP then was given pill for elective .  This morning she started to experience heavy bleeding w/ clots, changed pad once and sharp lower abd cramping.  Pt w/ few episodes nbnb emesis and feeling lightheaded- no fainting.  Denies f/c, headache, chest pain, sob, cough, uri sxs, nvd, urinary sxs, numbness/weakness, trauma

## 2020-11-20 NOTE — ED PROVIDER NOTE - PATIENT PORTAL LINK FT
You can access the FollowMyHealth Patient Portal offered by [facility] by registering at the following website: [email or web address]. By joining [facility]’s FollowMyHealth portal, you will also be able to view your health information using other applications (apps) compatible with our system.

## 2020-11-20 NOTE — ED PROVIDER NOTE - ATTENDING CONTRIBUTION TO CARE
25F  approx 10wks pregnant here w/ vaginal bleeding/pelvic cramping after medical  yesterday at [facility]. VSS, low abdominal tenderness. plan for pain control, nausea control, check hemoglobin. likely symptoms are 2/2 expected  after getting medical ab pill yesterday, however pt extremely anxious 25F  approx 7wks pregnant here w/ vaginal bleeding/pelvic cramping after medical  yesterday at [facility]. VSS, low abdominal tenderness. plan for pain control, nausea control, check hemoglobin. likely symptoms are 2/2 expected  after getting medical ab pill yesterday, however pt extremely anxious

## 2020-11-20 NOTE — ED PROVIDER NOTE - CLINICAL SUMMARY MEDICAL DECISION MAKING FREE TEXT BOX
25 F  LMP 10/7 approx 7 weeks preg p/w vaginal bleeding and pelvic cramping s/p medical  (unknown pill) yesterday given at [facility].  pt anxious appearing, +lower abd ttp and pelvic w/ pooling blood/clots. labs w/ stable h/h, Rh +, hcg quant [id number].  sonogram consistent with complete - no evidence IUP/extrauterine preg or retained poc.   pt improved s/p IVF, toradol, zofran.  pt afebrile, tolerating PO.  pt instructed [facility] rpt hcg and f/u.  discussed strict return parameters

## 2020-11-20 NOTE — ED PROVIDER NOTE - PHYSICAL EXAMINATION
Vitals reviewed  Gen: anxious appearing, nad, speaking in full sentences  Skin: wwp, no rash/lesions  HEENT: ncat, eomi, mmm  CV: rrr, no audible m/r/g  Resp: symmetrical expansion, ctab, no w/r/r  Abd: nondistended, soft, diffuse lower abd ttp, no rebound/guarding, no cvat  Pelvic: + pooling blood w/ few clots expelled on exam, os open, b/l adnexal and uterine ttp  Ext: FROM throughout, no peripheral edema  Neuro: alert/oriented, no focal deficits, steady gait

## 2020-11-20 NOTE — ED PROVIDER NOTE - NSFOLLOWUPINSTRUCTIONS_ED_ALL_ED_FT
Take tylenol 650mg or motrin 400-800mg as needed every 4-6 hours for pain.   [facility]- take results with you. Return to ED if you experience fever, dizziness, fainting, difficulty breathing, increased/heavier bleeding, vomiting or other concerns    Prostaglandin-Induced , Care After      This sheet gives you information about how to care for yourself after your procedure. Your health care provider may also give you more specific instructions. If you have problems or questions, contact your health care provider.      What can I expect after the procedure?  After the procedure, it is common to have:  •Bleeding that lasts for a few hours or a few days. It may feel like you are having a heavy menstrual period.      •A headache.      •Diarrhea.      •Nausea and vomiting.      •Chills.      •Dizziness.      Your next period will most likely start 4–6 weeks after the procedure, unless you start taking birth control pills.      Follow these instructions at home:      Medicines      •Take over-the-counter and prescription medicines only as told by your health care provider.      •Only take the medicines your health care provider recommends. Do not take aspirin. It can cause bleeding.      Activity     • Do not have sex for 2–3 weeks or until your health care provider approves.      •Rest and avoid activity that requires a lot of energy for 2–3 weeks.      • Do not drive or use heavy machinery while taking prescription pain medicine.      General instructions   •There will be bleeding after the procedure. It is recommended that you:  •Write down how many menstrual pads you use each day and how soaked they are. This could be useful information for your health care provider.      •Check for any large blood clots or tissue when you change your menstrual pad. If you pass tissue, save the tissue to show to your health care provider.        • Do not douche or use tampons until your health care provider approves.      •Ask your health care provider when you can start using hormonal birth control (contraception).      •Keep all follow-up visits as told by your health care provider. This is important.        Contact a health care provider if:    •You have chills or a fever.      •You have pain that is not relieved by prescription pain medicine.      •You have a bad-smelling vaginal discharge.      •You have pain or bleeding that gets worse instead of better.    •You have any of the following for more than 24 hours:  •Nausea.      •Vomiting.      •Diarrhea.          Get help right away if:    •You have severe cramps in your stomach, back, or abdomen.      •You pass large blood clots or tissue out of your vagina. Save any tissue for your health care provider to inspect.      •You need to change your pad more than once in an hour.      •You become light-headed, weak, or faint.        Summary    •After the procedure it is common to have bleeding, headache, diarrhea, nausea and vomiting, chills, and dizziness.      •Take over-the-counter and prescription medicines only as told by your health care provider.      •Do not have sex for 2–3 weeks or until your health care provider approves.      •When you are bleeding after the procedure, write down how many menstrual pads you use each day and how soaked they are.      •Keep all follow-up visits as told by your health care provider. This is important.      This information is not intended to replace advice given to you by your health care provider. Make sure you discuss any questions you have with your health care provider.

## 2020-11-23 LAB
-  AMPICILLIN: SIGNIFICANT CHANGE UP
-  CLINDAMYCIN: SIGNIFICANT CHANGE UP
-  ERYTHROMYCIN: SIGNIFICANT CHANGE UP
-  LEVOFLOXACIN: SIGNIFICANT CHANGE UP
-  PENICILLIN: SIGNIFICANT CHANGE UP
-  VANCOMYCIN: SIGNIFICANT CHANGE UP
CULTURE RESULTS: SIGNIFICANT CHANGE UP
METHOD TYPE: SIGNIFICANT CHANGE UP
ORGANISM # SPEC MICROSCOPIC CNT: SIGNIFICANT CHANGE UP
ORGANISM # SPEC MICROSCOPIC CNT: SIGNIFICANT CHANGE UP
SPECIMEN SOURCE: SIGNIFICANT CHANGE UP

## 2020-11-24 RX ORDER — CEPHALEXIN 500 MG
1 CAPSULE ORAL
Qty: 20 | Refills: 0
Start: 2020-11-24 | End: 2020-11-28

## 2021-08-03 NOTE — ED PROVIDER NOTE - GENITOURINARY NEGATIVE STATEMENT, MLM
[Joint Pain] : joint pain [Negative] : Heme/Lymph [Fever] : no fever [Chills] : no chills [Cough] : no cough [SOB on Exertion] : no shortness of breath on exertion no dysuria, no frequency, and no hematuria.

## 2022-04-23 ENCOUNTER — EMERGENCY (EMERGENCY)
Facility: HOSPITAL | Age: 27
LOS: 1 days | Discharge: HOME | End: 2022-04-23
Admitting: EMERGENCY MEDICINE
Payer: COMMERCIAL

## 2022-04-23 VITALS
OXYGEN SATURATION: 97 % | TEMPERATURE: 98 F | SYSTOLIC BLOOD PRESSURE: 108 MMHG | RESPIRATION RATE: 18 BRPM | HEIGHT: 69 IN | HEART RATE: 95 BPM | WEIGHT: 182.98 LBS | DIASTOLIC BLOOD PRESSURE: 72 MMHG

## 2022-04-23 DIAGNOSIS — M25.571 PAIN IN RIGHT ANKLE AND JOINTS OF RIGHT FOOT: ICD-10-CM
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
## 2022-04-23 DIAGNOSIS — Z86.19 PERSONAL HISTORY OF OTHER INFECTIOUS AND PARASITIC DISEASES: ICD-10-CM

## 2022-04-23 DIAGNOSIS — R39.15 URGENCY OF URINATION: ICD-10-CM

## 2022-04-23 DIAGNOSIS — R10.2 PELVIC AND PERINEAL PAIN: ICD-10-CM

## 2022-04-23 DIAGNOSIS — S93.401A SPRAIN OF UNSPECIFIED LIGAMENT OF RIGHT ANKLE, INITIAL ENCOUNTER: ICD-10-CM

## 2022-04-23 LAB
APPEARANCE UR: CLEAR — SIGNIFICANT CHANGE UP
BACTERIA # UR AUTO: PRESENT /HPF
BILIRUB UR-MCNC: NEGATIVE — SIGNIFICANT CHANGE UP
COLOR SPEC: YELLOW — SIGNIFICANT CHANGE UP
DIFF PNL FLD: NEGATIVE — SIGNIFICANT CHANGE UP
EPI CELLS # UR: ABNORMAL /HPF (ref 0–5)
GLUCOSE UR QL: NEGATIVE — SIGNIFICANT CHANGE UP
KETONES UR-MCNC: NEGATIVE — SIGNIFICANT CHANGE UP
LEUKOCYTE ESTERASE UR-ACNC: ABNORMAL
NITRITE UR-MCNC: NEGATIVE — SIGNIFICANT CHANGE UP
PH UR: 5.5 — SIGNIFICANT CHANGE UP (ref 5–8)
PROT UR-MCNC: NEGATIVE MG/DL — SIGNIFICANT CHANGE UP
SP GR SPEC: >=1.03 — SIGNIFICANT CHANGE UP (ref 1–1.03)
UROBILINOGEN FLD QL: 0.2 E.U./DL — SIGNIFICANT CHANGE UP
WBC UR QL: ABNORMAL /HPF

## 2022-04-23 PROCEDURE — 81001 URINALYSIS AUTO W/SCOPE: CPT

## 2022-04-23 PROCEDURE — 73610 X-RAY EXAM OF ANKLE: CPT | Mod: 26,RT

## 2022-04-23 PROCEDURE — 96372 THER/PROPH/DIAG INJ SC/IM: CPT

## 2022-04-23 PROCEDURE — 73610 X-RAY EXAM OF ANKLE: CPT

## 2022-04-23 PROCEDURE — 99284 EMERGENCY DEPT VISIT MOD MDM: CPT | Mod: 25

## 2022-04-23 PROCEDURE — 87086 URINE CULTURE/COLONY COUNT: CPT

## 2022-04-23 PROCEDURE — 87591 N.GONORRHOEAE DNA AMP PROB: CPT

## 2022-04-23 PROCEDURE — 87491 CHLMYD TRACH DNA AMP PROBE: CPT

## 2022-04-23 PROCEDURE — 73610 X-RAY EXAM OF ANKLE: CPT | Mod: 26

## 2022-04-23 RX ORDER — METRONIDAZOLE 500 MG
500 TABLET ORAL ONCE
Refills: 0 | Status: COMPLETED | OUTPATIENT
Start: 2022-04-23 | End: 2022-04-23

## 2022-04-23 RX ORDER — IBUPROFEN 200 MG
600 TABLET ORAL ONCE
Refills: 0 | Status: COMPLETED | OUTPATIENT
Start: 2022-04-23 | End: 2022-04-23

## 2022-04-23 RX ORDER — METRONIDAZOLE 500 MG
1 TABLET ORAL
Qty: 14 | Refills: 0
Start: 2022-04-23 | End: 2022-04-29

## 2022-04-23 RX ORDER — CEFTRIAXONE 500 MG/1
500 INJECTION, POWDER, FOR SOLUTION INTRAMUSCULAR; INTRAVENOUS ONCE
Refills: 0 | Status: COMPLETED | OUTPATIENT
Start: 2022-04-23 | End: 2022-04-23

## 2022-04-23 RX ADMIN — Medication 600 MILLIGRAM(S): at 16:18

## 2022-04-23 RX ADMIN — Medication 500 MILLIGRAM(S): at 17:10

## 2022-04-23 RX ADMIN — Medication 100 MILLIGRAM(S): at 17:10

## 2022-04-23 RX ADMIN — CEFTRIAXONE 500 MILLIGRAM(S): 500 INJECTION, POWDER, FOR SOLUTION INTRAMUSCULAR; INTRAVENOUS at 17:10

## 2022-04-23 NOTE — ED PROVIDER NOTE - CLINICAL SUMMARY MEDICAL DECISION MAKING FREE TEXT BOX
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  on exam pt appearing comfortable, Abd: nondistended, soft, nontender, no rebound/guarding, no cvat;  Pelvic: copious grey discharge, no bleeding, no cmt, no adnexal mass or ttp;  Ext: + swelling/ecchymosis over R ankle w/ ttp over lateral malleolus w/ limited ROM no prox tib/fib ttp, otherwise FROM all other ext, no peripheral edema, DP/PT and radial pulses 2+, SILT.  upreg neg, UA contaminated, cult sent, will treat for STI/BV based on exam w/ rocephin/doxy/flagyl.  R ankle xray shows no e/o fracture or dislocation.  ankle sprain, will give air cast, crutches for WBAT and educated on RICE.  can f/u outpt w/ GYN and ortho.  discussed strict return parameters

## 2022-04-23 NOTE — ED PROVIDER NOTE - NSFOLLOWUPINSTRUCTIONS_ED_ALL_ED_FT
Take tylenol 650mg or motrin 400-800mg as needed every 4-6 hours for pain.   REST- Rest your hurting/injured joint or extremity to decrease pain and swelling for 24-48 hours    ICE- Apply ice to area of pain to decreased inflammation and pain, put towel/barrier between ice and skin. You can keep ice on for 20 minutes at a time 4-8 times daily   COMPRESSION- Wear ace wrap or brace for support to reduce swelling.  Make sure not to wrap too tight, loosen if skin feeling numb/tingling or skin turns blue   ELEVATION- Elevate hurting/injured area 6 or more inches about level of heart to decrease swelling/inflammation.  Use pillow under joint to elevate area    Make sure to take full course of antibiotics for pelvic infection as prescribed.  We will call you with STD and urine culture results if anything comes back abnormal.     Follow up with GYN doctor and orthopedic specialist within one week  [facility] (Thursday afternoons) - call [phone] to schedule    You may call our referrals coordinator at [phone] Monday to Friday 11am-7pm for assistance with making an appointment      A sexually transmitted disease (STD) is a disease or infection that may be passed (transmitted) from person to person, usually during sexual activity. This may happen by way of saliva, semen, blood, vaginal mucus, or urine. Symptoms vary depending on the type of STD acquired and may include pain in the groin, discharge, and lesions or a rash. If you are started on an antibiotic, take it exactly as instructed. Avoid sexual contact of any kind until cleared by a health care professional. Contact your sexual partner(s) to inform them of your diagnosis so that they may be tested and treated as well.    SEEK IMMEDIATE MEDICAL CARE IF YOU HAVE ANY OF THE FOLLOWING SYMPTOMS: severe abdominal pain, high fever, nausea/vomiting, or unintended weight loss.      Ankle Sprain       An ankle sprain is a stretch or tear in a ligament in the ankle. Ligaments are tissues that connect bones to each other.    The two most common types of ankle sprains are:  •Inversion sprain. This happens when the foot turns inward and the ankle rolls outward. It affects the ligament on the outside of the foot (lateral ligament).      •Eversion sprain. This happens when the foot turns outward and the ankle rolls inward. It affects the ligament on the inner side of the foot (medial ligament).        What are the causes?    This condition is often caused by accidentally rolling or twisting the ankle.      What increases the risk?    You are more likely to develop this condition if you play sports.      What are the signs or symptoms?     Symptoms of this condition include:  •Pain in your ankle.      •Swelling.      •Bruising. This may develop right after you sprain your ankle or 1–2 days later.      •Trouble standing or walking, especially when you turn or change directions.        How is this diagnosed?    This condition is diagnosed with:  •A physical exam. During the exam, your health care provider will press on certain parts of your foot and ankle and try to move them in certain ways.      •X-ray imaging. These may be taken to see how severe the sprain is and to check for broken bones.        How is this treated?    This condition may be treated with:  •A brace or splint. This is used to keep the ankle from moving until it heals.      •An elastic bandage. This is used to support the ankle.      •Crutches.      •Pain medicine.      •Surgery. This may be needed if the sprain is severe.      •Physical therapy. This may help to improve the range of motion in the ankle.        Follow these instructions at home:    If you have a brace or a splint:     •Wear the brace or splint as told by your health care provider. Remove it only as told by your health care provider.      •Loosen the brace or splint if your toes tingle, become numb, or turn cold and blue.      •Keep the brace or splint clean.    •If the brace or splint is not waterproof:  •Do not let it get wet.       •Cover it with a watertight covering when you take a bath or a shower.        If you have an elastic bandage (dressing):     •Remove it to shower or bathe.      •Try not to move your ankle much, but wiggle your toes from time to time. This helps to prevent swelling.      •Adjust the dressing to make it more comfortable if it feels too tight.      •Loosen the dressing if you have numbness or tingling in your foot, or if your foot becomes cold and blue.        Managing pain, stiffness, and swelling      •Take over-the-counter and prescription medicines only as told by your health care provider.      •For 2–3 days, keep your ankle raised (elevated) above the level of your heart as much as possible.    •If directed, put ice on the injured area:  •If you have a removable brace or splint, remove it as told by your health care provider.      •Put ice in a plastic bag.      •Place a towel between your skin and the bag.      •Leave the ice on for 20 minutes, 2–3 times a day.        General instructions     •Rest your ankle.      • Do not use the injured limb to support your body weight until your health care provider says that you can. Use crutches as told by your health care provider.      • Do not use any products that contain nicotine or tobacco, such as cigarettes, e-cigarettes, and chewing tobacco. If you need help quitting, ask your health care provider.      •Keep all follow-up visits as told by your health care provider. This is important.        Contact a health care provider if:    •You have rapidly increasing bruising or swelling.      •Your pain is not relieved with medicine.        Get help right away if:    •Your foot or toes become numb or blue.      •You have severe pain that gets worse.        Summary    •An ankle sprain is a stretch or tear in a ligament in the ankle. Ligaments are tissues that connect bones to each other.      •This condition is often caused by accidentally rolling or twisting the ankle.      •Symptoms include pain, swelling, bruising, and trouble walking.      •To relieve pain and swelling, put ice on the affected ankle, raise your ankle above the level of your heart, and use an elastic bandage.      •Keep all follow-up visits as told by your health care provider. This is important.      This information is not intended to replace advice given to you by your health care provider. Make sure you discuss any questions you have with your health care provider.

## 2022-04-23 NOTE — ED PROVIDER NOTE - WR INTERPRETATION 1
MSK XR negative - No fracture, No dislocation, No foreign body, anmol island inferior to distal tibia

## 2022-04-23 NOTE — ED ADULT NURSE NOTE - OBJECTIVE STATEMENT
26y otherwise healthy female BIBA c/o ankle pain/injury. states she twisted her ankle outwards while going up stairs PTA. able to bear weight w/ pain. swelling noted to medial ankle. tender on palpation. denies numbness/tingling. No acute distress noted at this time.

## 2022-04-23 NOTE — ED PROVIDER NOTE - PATIENT PORTAL LINK FT
You can access the FollowMyHealth Patient Portal offered by [facility] by registering at the following website: [email or web address] By joining [facility]’s FollowMyHealth portal, you will also be able to view your health information using other applications (apps) compatible with our system.

## 2022-04-23 NOTE — ED ADULT TRIAGE NOTE - CHIEF COMPLAINT QUOTE
Pt BIBA after tripping while walking upstairs, endorses right ankle pain. No deformity noted, pts states she was unable to bear weight.

## 2022-04-23 NOTE — ED PROVIDER NOTE - PHYSICAL EXAMINATION
Vitals reviewed  Gen: comfortable appearing, nad, speaking in full sentences  Skin: wwp, no rash/lesions  HEENT: ncat, eomi, mmm  CV: rrr, no audible m/r/g  Resp: symmetrical expansion, ctab, no w/r/r  Abd: nondistended, soft, nontender, no rebound/guarding, no cvat  Pelvic: copious grey discharge, no bleeding, no cmt, no adnexal mass or ttp   Ext: + swelling/ecchymosis over R ankle w/ ttp over lateral malleolus w/ limited ROM no prox tib/fib ttp, otherwise FROM all other ext, no peripheral edema, DP/PT and radial pulses 2+, SILT  Neuro: alert/oriented, no focal deficits

## 2022-04-23 NOTE — ED PROVIDER NOTE - OBJECTIVE STATEMENT
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  pt reports she was coming to ED for pelvic pain when she twisted R ankle and now w/ pain/swelling, unable to bear weight.  Also reports intermittent diffuse lower pelvic pain x 2 mons, last 2 mons menses only 2 days (typically 5+ days).  + increased urination, no hematuria/dysuria.  no vaginal discharge, states not sexually active for past 2 mons, prior was not using protection.  denies any recent HSV outbreaks.  denies f/c, headache, dizziness, neck/back pain, subsequent fall, chest pain, sob, nvd, other concerns

## 2022-04-24 LAB
CULTURE RESULTS: SIGNIFICANT CHANGE UP
SPECIMEN SOURCE: SIGNIFICANT CHANGE UP

## 2022-08-29 NOTE — ED PROVIDER NOTE - PROGRESS NOTE ADDITIONAL2
8/29/2022   Chief Complaint   Patient presents with    ED Follow-up     ED vollow up visit from 8.25.22, right knee         History of Present Illness:                             Félix Ng is a 72 y.o. female referred by emergency room for evaluation and treatment of right knee pain. Patient was seen in the emergency room 4 days prior where she had a syncopal episode and had a fall onto her right knee. She has been having pain at the right knee joint since the fall but states she has a history of tibial shaft pain since her most recent revision knee replacement in 2019. She states that the tibial shaft pain is not worsened since the fall. She has no complaints of instability and no specific quad pain. She is able to ambulate but utilizes a cane which is normal for her. No advanced imaging thus far this my first time seeing the patient. Patient underwent primary total knee replacement in 2013 but then underwent a revision in 2016 due to instability. She states that she had an additional revision knee replacement as 20 and a larger polywas placed that time per the patient. She states that she has had continued shin pain since then but no additional complications and no history of any type of infection. She denies any constitutional symptoms today. The pain's location is diffuse throughout right knee. she describes the symptoms as aching, sharp and stabbing. Symptoms improve with rest. Symptoms worsen with deep knee bending, getting up from a chair, weight bearing, sitting for prolonged periods of time, twisting activities. Patient denies numbness, tingling, fever, chills. Denies swelling or effusions. No prominent mechanical symptoms. Denies instability. Worse with increased activity. Better with rest.    Treatment thus far has included rest, activity modifications, oral medications without significant relief. Here today to discuss diagnosis and treatment options.       Is affecting ADLs. Pain is 10/10 at it's worst.    No advanced imaging thus far    Outside reports reviewed: Outside imaging and note reviewed    Patient's medications, allergies, past medical, surgical, social and family histories were reviewed and updated as appropriate. MA HPI: Patient is here for follow up on ED visit on 8.25.22. (adm date) Patient denies new injury since ED visit. Patient rates pain 9/10 in right knee. Patient has taken Rx percocet for pain. Patient denies numbness and tingling in right leg. Patient states she thinks she passed out due to her BP medication. Xrays performed in ED, impression as follows    Medical History  Patient's medications, allergies, past medical, surgical, social and family histories were reviewed and updated as appropriate.     Past Medical History:   Diagnosis Date    Arthritis     Chronic kidney disease     COPD (chronic obstructive pulmonary disease) (HCC)     Diabetes mellitus ([facility]     Diabetic eye exam ([facility] 5-27-16    No diabetic retinopathy-Dr. Tra Flores    Emphysema     History of bone density study 04/08/2016    Osteopenia    Hyperlipidemia     Hypertension     Screening mammogram, encounter for 02/03/2016    Stable Mammographic-no evidence of malignancy    Systolic congestive heart failure ([facility] 7/28/2021     Past Surgical History:   Procedure Laterality Date    CARPAL TUNNEL RELEASE Bilateral     CATARACT REMOVAL Right 06/12/2018    per GERARDO Stalpes, [address] (CERVIX STATUS UNKNOWN)      JOINT REPLACEMENT      NECK SURGERY      PAIN MANAGEMENT PROCEDURE Left 2/11/2021    RADIOFREQUENCY ABLATION LMB LEFT L4 & 5 performed by Caleb Baum MD at [place] [address] 2/16/2022    GASTRECTOMY SLEEVE LAPAROSCOPIC ROBOTIC performed by Luis La MD at [address] [place] [address] Road 9/14/2021    EGD BIOPSY performed by Saloni Monaco MD at [address] Right 8/15/2022     BREAST NEEDLE BIOPSY RIGHT 8/15/2022 Coreen Mathur MD [facility] [address] [facility]     Family History   Problem Relation Age of Onset    Cancer Mother     Diabetes Mother     High Blood Pressure Mother     High Cholesterol Mother     Stroke Father     High Blood Pressure Father     High Cholesterol Father     Cancer Sister         Breast    Diabetes Sister     Heart Disease Sister     High Blood Pressure Sister     High Cholesterol Sister     Breast Cancer Sister     Diabetes Brother     Heart Disease Brother     High Blood Pressure Brother     High Cholesterol Brother     Cancer Sister         Breast    Breast Cancer Sister     Cancer Sister         Breast    Breast Cancer Sister     Cancer Sister         Lung    Breast Cancer Maternal Aunt     Breast Cancer Paternal Aunt      Social History     Socioeconomic History    Marital status:    Tobacco Use    Smoking status: Former     Packs/day: 2.00     Years: 40.00     Pack years: 80.00     Types: Cigarettes     Quit date: 2011     Years since quittin.0    Smokeless tobacco: Never   Vaping Use    Vaping Use: Never used   Substance and Sexual Activity    Alcohol use: No     Alcohol/week: 0.0 standard drinks    Drug use: No    Sexual activity: Not Currently     Social Determinants of Health     Financial Resource Strain: Unknown    Difficulty of Paying Living Expenses: Patient refused   Food Insecurity: Unknown    Worried About Running Out of Food in the Last Year: Patient refused    Ran Out of Food in the Last Year: Patient refused     Current Outpatient Medications   Medication Sig Dispense Refill    furosemide (LASIX) 40 MG tablet Take 1 tablet by mouth in the morning. 60 tablet 3    Multiple Vitamin (MVI, BARIATRIC ADVANTAGE MULTI-FORMULA, CHEW TAB) Take 1 tablet by mouth in the morning.       glimepiride (AMARYL) 4 MG tablet TAKE 1 TABLET BY MOUTH TWO TIMES A DAY (Patient not taking: Reported on 8/17/2022) 180 tablet 3    potassium chloride (KLOR-CON) 20 MEQ packet DISSOLVE ONE PACKET daily 90 packet 3    rosuvastatin (CRESTOR) 20 MG tablet Take 1 tablet by mouth daily 90 tablet 3    lisinopril (PRINIVIL;ZESTRIL) 2.5 MG tablet TAKE 1 TABLET BY MOUTH EVERY DAY 90 tablet 3    pantoprazole (PROTONIX) 20 MG tablet Take 1 tablet by mouth daily 90 tablet 3    FREESTYLE LITE strip USE TO CHECK BLOOD SUGAR THREE TIMES A  each 5    gabapentin (NEURONTIN) 600 MG tablet Take 1 tablet by mouth 3 times daily for 90 days. 270 tablet 3    diphenhydrAMINE (BENADRYL) 25 MG tablet Take 25 mg by mouth in the morning and at bedtime      Umeclidinium Bromide (INCRUSE ELLIPTA) 62.5 MCG/INH AEPB Inhale 1 puff into the lungs daily 3 each 2    SYMBICORT 160-4.5 MCG/ACT AERO Inhale 2 puffs into the lungs 2 times daily 3 each 2    albuterol sulfate  (90 Base) MCG/ACT inhaler Inhale 2 puffs into the lungs 4 times daily 1 each 3    blood glucose monitor strips Test 3-4 times a day & as needed for symptoms of irregular blood glucose. 100 strip 5    aspirin EC 81 MG EC tablet Take 1 tablet by mouth daily 90 tablet 1    DILT- MG extended release capsule TAKE 1 CAPSULE BY MOUTH ONE TIME A DAY 90 capsule 3    OXYGEN Inhale 2 L into the lungs continuous        No current facility-administered medications for this visit. Allergies   Allergen Reactions    Lyrica [Pregabalin] Anaphylaxis    Darvocet A500 [Propoxyphene N-Acetaminophen]     Darvon [Fd&C Red #40-Fd&C Yellow #10-Propoxyphene]     Hydrocodone-Acetaminophen      Heart races and jittery      Hydromorphone Hcl      Other reaction(s): Tachycardia    Doxycycline      Full body rash 3 days after starting med. Dilaudid [Hydromorphone] Palpitations    Propoxyphene      Other reaction(s): Weakness         Review of Systems   Constitutional:  Positive for activity change. Negative for fatigue and fever.    HENT:  Negative for sneezing, sore throat and voice change. Respiratory:  Negative for cough, shortness of breath and wheezing. Cardiovascular:  Negative for leg swelling. Gastrointestinal:  Negative for nausea and vomiting. Musculoskeletal:  Positive for arthralgias and myalgias. Negative for back pain, gait problem, joint swelling, neck pain and neck stiffness. Skin:  Negative for color change, rash and wound. Neurological:  Negative for weakness and numbness. Psychiatric/Behavioral:  Negative for behavioral problems, confusion and self-injury. Examination:  General Exam:  Vitals: /74 (Site: Right Wrist, Position: Sitting)   Pulse 76   Wt 153 lb (69.4 kg)   SpO2 97%   BMI 28.91 kg/m²    Physical Exam  Constitutional:       General: She is not in acute distress. Appearance: Normal appearance. She is obese. She is not ill-appearing. HENT:      Head: Normocephalic and atraumatic. Eyes:      General:         Right eye: No discharge. Left eye: No discharge. Extraocular Movements: Extraocular movements intact. Cardiovascular:      Pulses: Normal pulses. Pulmonary:      Effort: Pulmonary effort is normal.      Breath sounds: Normal breath sounds. Musculoskeletal:         General: Swelling, tenderness and signs of injury present. No deformity. Cervical back: Normal range of motion. Right hip: Normal.      Left hip: Normal.      Right upper leg: Normal.      Left upper leg: Normal.      Right knee: Swelling, bony tenderness and crepitus present. No deformity, effusion, erythema, ecchymosis or lacerations. Normal range of motion. Tenderness present. No medial joint line, lateral joint line or patellar tendon tenderness. Normal patellar mobility. Left knee: Normal.      Right lower leg: Normal. No edema. Left lower leg: Normal. No edema.       Right ankle: Normal.      Left ankle: Normal.      Right foot: Normal.      Left foot: Normal.   Skin:     General: Skin is warm and dry. Capillary Refill: Capillary refill takes less than 2 seconds. Neurological:      General: No focal deficit present. Mental Status: She is alert and oriented to person, place, and time. Mental status is at baseline. Gait: Gait normal.   Psychiatric:         Mood and Affect: Mood normal.         Behavior: Behavior normal.      RIGHT KNEE EXAMINATION       OBSERVATION / INSPECTION     Gait: Minimally antalgic and uses cane    Alignment: Neutral     Scars: Midline patellar incision from previous knee replacement and revisions is well-healed    Muscle atrophy: Mild    Effusion: None but diffuse overall swelling    Warmth: None     Discoloration: none       TENDERNESS / CREPITUS (T / C): Patella - / -     Lateral joint line + minimal/ -      Medial joint line  + minimal/ -     Peripatellar lateral - / -  Peripatellar medial  - / -     Medial plica - / -  Lateral plica - / -    Patellar tendon - / -   Prepatellar Bursa - / -     Popliteal fossa - / -    Gastrocnemius - / -    Quadricep - / -   Quad tendon - / -      Tibial tubercle - / -     Thigh/hamstring - / -  Pes anserine/HS - / -     ITB - / -     Tibia + at midshaft around location of distal portion of tibial stem/ -       Fibula - / -    Tib/fib joint - / -     MFC - / -    LFC - / -     MCL: Proximal - / -  Distal - / -    LCL - / -    MCL - / -      ROM: (* = pain)  PASSIVE  ACTIVE     Left :    0 / 135  0 / 145      Right :    0 / 125  0 / 125      PATELLOFEMORAL EXAMINATION:    See above noted areas of tenderness.      Patella position     Subluxation / dislocation: Centered         Pain on terminal extension: -    Pain on terminal flexion: -      No significant pain or gapping on varus and valgus stressing      STRENGTH: (* = with pain) PAINFUL SIDE    Quadricep 5/5    Hamstrin/5      EXTREMITY NEURO-VASCULAR EXAMINATION:     Sensation:  Grossly intact to light touch all dermatomal regions. Motor Function:  Fully intact motor function at hip, knee, foot and ankle      DTRs;  quadriceps and  achilles 2+. No clonus and downgoing Babinski. Vascular status:  DP and PT pulses 2+, brisk capillary refill, symmetric. Diagnostic testing:  X-ray images were reviewed by myself and discussed with the patient:  5 views of the right knee from outside facility demonstrate: The patient is status post right total knee arthroplasty with long femoral   and tibial stems. There is no periprosthetic fracture. The device appears   constrained. Thin periprosthetic lucency is noted at the tibial bone-cement interface, as   before. This measures less than 2 mm. There has been patellar resurfacing. There is a small suprapatellar joint effusion. Office Procedures:  No orders of the defined types were placed in this encounter. Assessment and Plan    A: Chronic Right knee pain status post revision    P:   I had a thorough conversation with the patient regarding her x-rays and physical exam findings. I explained that I do not do revisions and also went over the x-rays with my partner who states that this is beyond something he would. I stated that I am concerned that she has periprosthetic loosening approximately which is why she is having pain distally at the distal portion of the tibial stem. I suggest at this time she continue using a cane or a should switch to a walker to protect her weightbearing and should not do extensive amount of walking or no significant exercising to protect the lower extremity until she can be better evaluated by a revision specialist.  However, she can be weightbearing as tolerated and range of motion as tolerated. Referral was provided to Dr. Catrina Habermann at [address] [place] [address] who my partner has sent patients to previously.   She will contact us if she needs to be seen for any additional orthopedic issues or has any questions and I will be happy to discuss any of these orthopedic issues with patient. Otherwise, patient will be seen as needed. She should continue using ice and over-the-counter anti-inflammatories for pain control. She develops any constitutional symptoms including any swelling, increased pain, redness at the knee she is advised to go to the emergency room immediately. If she develops any increasing tibial shaft pain she should discontinue weightbearing until seen in follow-up with Dr. Derick Patel. All questions were answered and patient voices understanding.     Electronically signed by Dustin Sams DO on 8/29/2022 at 11:57 AM Additional Progress Note...

## 2022-10-23 ENCOUNTER — EMERGENCY (EMERGENCY)
Facility: HOSPITAL | Age: 27
LOS: 1 days | Discharge: HOME | End: 2022-10-23
Admitting: EMERGENCY MEDICINE
Payer: COMMERCIAL

## 2022-10-23 VITALS
DIASTOLIC BLOOD PRESSURE: 71 MMHG | TEMPERATURE: 98 F | RESPIRATION RATE: 18 BRPM | SYSTOLIC BLOOD PRESSURE: 108 MMHG | HEART RATE: 92 BPM | WEIGHT: 190.04 LBS | HEIGHT: 69 IN | OXYGEN SATURATION: 96 %

## 2022-10-23 VITALS
RESPIRATION RATE: 16 BRPM | OXYGEN SATURATION: 97 % | HEART RATE: 90 BPM | SYSTOLIC BLOOD PRESSURE: 111 MMHG | DIASTOLIC BLOOD PRESSURE: 86 MMHG

## 2022-10-23 PROCEDURE — 99282 EMERGENCY DEPT VISIT SF MDM: CPT

## 2022-10-23 PROCEDURE — 99283 EMERGENCY DEPT VISIT LOW MDM: CPT

## 2022-10-23 RX ORDER — IBUPROFEN 200 MG
1 TABLET ORAL
Qty: 15 | Refills: 0
Start: 2022-10-23 | End: 2022-10-27

## 2022-10-23 RX ORDER — OFLOXACIN 200 MG
4 TABLET ORAL
Qty: 15 | Refills: 0
Start: 2022-10-23 | End: 2022-10-29

## 2022-10-23 RX ORDER — IBUPROFEN 200 MG
600 TABLET ORAL ONCE
Refills: 0 | Status: DISCONTINUED | OUTPATIENT
Start: 2022-10-23 | End: 2022-10-27

## 2022-10-23 NOTE — ED PROVIDER NOTE - NSFOLLOWUPINSTRUCTIONS_ED_ALL_ED_FT
use ear drops as discussed  take ibuprofen as needed  follow up with ent in 2-3 days  return to ED for any worsening or concerning symptoms

## 2022-10-23 NOTE — ED PROVIDER NOTE - NSFOLLOWUPCLINICS_GEN_ALL_ED_FT
[facility]  Otolaryngology (ENT)  [address] [place] [address]  Phone: [phone]  Fax: [phone]

## 2022-10-23 NOTE — ED PROVIDER NOTE - OBJECTIVE STATEMENT
The pt is a 26 y/o F, who presents to ED c/o "cysts to ears" x 1.5 wks, has had surg to excise them in past, has not f/u w/ent over past wk. pt c/o pressure and decreased hearing, took a tyl w/min relief over past few d. Denies fevers, chills, ear trauma, pus or bleeding.

## 2022-10-23 NOTE — ED ADULT TRIAGE NOTE - PATIENT ON (OXYGEN DELIVERY METHOD)
room air How Severe Is Your Acne?: moderate Is This A New Presentation, Or A Follow-Up?: Acne What Type Of Note Output Would You Prefer (Optional)?: Standard Output

## 2022-10-23 NOTE — ED PROVIDER NOTE - ENMT, MLM
Airway patent, Nasal mucosa clear. Mouth with normal mucosa. Throat has no vesicles, no oropharyngeal exudates and uvula is midline. Ears: + 2mm area of swelling to R outer ear canal and a 3 mm area of swelling to L outer ear canal, + erythema, few exudates to L ear canal, TMs pearly gray b/l, no cervical lymphadenopathy b/l

## 2022-10-23 NOTE — ED PROVIDER NOTE - CARE PROVIDERS DIRECT ADDRESSES
[email or web address]

## 2022-10-23 NOTE — ED PROVIDER NOTE - PATIENT PORTAL LINK FT
You can access the FollowMyHealth Patient Portal offered by [facility] by registering at the following website: [email or web address]. By joining [employer]’s FollowMyHealth portal, you will also be able to view your health information using other applications (apps) compatible with our system.

## 2022-10-23 NOTE — ED ADULT TRIAGE NOTE - CHIEF COMPLAINT QUOTE
Patient c/o of " 2 cysts in both of my ears and hearing lose on left ear " symptoms X weeks becoming worse.  Also c/o of tingling on throat.  No fever or chills, no SOB.

## 2022-10-23 NOTE — ED PROVIDER NOTE - CARE PROVIDER_API CALL
Mehran Anderson)  Otolaryngology  [address] [place] [address], [address] [place] [address]  Phone: [phone]  Fax: [phone]  Follow Up Time:     Elham Mayers)  Otolaryngology  [address] [place] [address], [address] [place] [address]  Phone: [phone]  Fax: [phone]  Follow Up Time:

## 2022-10-23 NOTE — ED ADULT NURSE NOTE - NSICDXNOPASTMEDICALHX_GEN_ALL_CORE
Date:  2/6/2017   Patient Name:  Nel Piper      [facility]#:  [id number]      Age:  47 y.o. Diagnosis:  Osteo Right Hip      Admission Date:  2/2/2017      Discharge Date:  2/3/2017      Discharge Plan:  [place] [facility]      Admitting Provider: Cheryl Mann MD      Patient Phone #:  [phone]                 Mobile [phone]          Discharge Follow-Up Phone Call Questions      Hello, my name is Janusz Little, and I am calling from [facility].  I am calling because you were recently discharged from our facility and I wanted to check on how you are doing. How are you feeling? Doing fine, thank you. I would also like to ask you a few questions about your after-care plan. 1)  Were you able to get your prescription(s) filled?: Yes     2)  Did you make your follow-up appointment?:  2/17/17     3)  Is there any reason you might not be able to keep your follow-up appointments?:  No     4)  Did you understand your discharge plan?:  Yes     5)  Are you expecting a Visiting Nurse, Physical Therapy or Equipment?:  Yes                                  6)  Do you have an estimated discharge from the facility?:  Discharged home. 7)  Do you have any questions?:  No - but thanks for the phone call. We appreciate your taking time to speak with us about your follow-up care. Is there anything I can do for you? If you have any questions, please call me at [phone]. Thank you. <-- Click to add NO pertinent Past Medical History

## 2022-10-23 NOTE — ED PROVIDER NOTE - CLINICAL SUMMARY MEDICAL DECISION MAKING FREE TEXT BOX
pt c/o b/l ear "cysts" - has had them surg removed yrs ago, noticed that symptoms returned about 1.5 wks ago, has not gone back to her ent- on exam b/l (L>R) areas of swelling to anterior ear canals - no fluctuance, no pus or bleeding, + tend to palp, no cervical lymphadenopathy, pharynx clear, will cover w/abx ear gtts, prn ibuprofen, importance of ent f/u for eval and further / definitive tx discussed at length, pt understands and agrees w/plan, strict return precautions given

## 2022-10-23 NOTE — ED ADULT NURSE NOTE - BREATH SOUNDS, MLM
Partial Purse String (Simple) Text: Given the location of the defect and the characteristics of the surrounding skin a simple purse string closure was deemed most appropriate.  Undermining was performed circumfirentially around the surgical defect.  A purse string suture was then placed and tightened. Wound tension only allowed a partial closure of the circular defect. Clear

## 2022-10-25 DIAGNOSIS — H92.03 OTALGIA, BILATERAL: ICD-10-CM

## 2022-12-05 ENCOUNTER — EMERGENCY (EMERGENCY)
Facility: HOSPITAL | Age: 27
LOS: 1 days | Discharge: HOME | End: 2022-12-05
Attending: STUDENT IN AN ORGANIZED HEALTH CARE EDUCATION/TRAINING PROGRAM | Admitting: INTERNAL MEDICINE
Payer: COMMERCIAL

## 2022-12-05 VITALS
HEART RATE: 92 BPM | DIASTOLIC BLOOD PRESSURE: 73 MMHG | HEIGHT: 69 IN | RESPIRATION RATE: 18 BRPM | OXYGEN SATURATION: 98 % | WEIGHT: 179.9 LBS | SYSTOLIC BLOOD PRESSURE: 108 MMHG | TEMPERATURE: 99 F

## 2022-12-05 LAB
HETEROPH AB TITR SER AGGL: NEGATIVE — SIGNIFICANT CHANGE UP
HIV 1+2 AB+HIV1 P24 AG SERPL QL IA: SIGNIFICANT CHANGE UP

## 2022-12-05 PROCEDURE — 99283 EMERGENCY DEPT VISIT LOW MDM: CPT

## 2022-12-05 PROCEDURE — 87389 HIV-1 AG W/HIV-1&-2 AB AG IA: CPT

## 2022-12-05 PROCEDURE — 87081 CULTURE SCREEN ONLY: CPT

## 2022-12-05 PROCEDURE — 86308 HETEROPHILE ANTIBODY SCREEN: CPT

## 2022-12-05 PROCEDURE — 36415 COLL VENOUS BLD VENIPUNCTURE: CPT

## 2022-12-05 PROCEDURE — 99284 EMERGENCY DEPT VISIT MOD MDM: CPT

## 2022-12-05 NOTE — ED PROVIDER NOTE - OBJECTIVE STATEMENT
26 y/o F with no PMHx presents to ED with several months of throat discomfort. Symptoms have not been worsening or getting better. Denies fevers, SOB, issues with eating/speaking, weight loss/gain, or fatigue.

## 2022-12-05 NOTE — ED PROVIDER NOTE - PHYSICAL EXAMINATION
General: Awake, alert and oriented. No acute distress. Well developed, hydrated and nourished. Appears stated age.   Skin: Skin in warm, dry and intact without rashes or lesions. Appropriate color for ethnicity  HENMT: head normocephalic and atraumatic; bilateral external ears without swelling. no nasal discharge. moist oral mucosa. no posterior oropharyngeal swelling or erythema. small white spot on left tonsillar pillar that is nontender. supple neck, trachea midline. no lymphadenopathy, swelling, or ttp externally.   EYES: Conjunctiva clear. nonicteric sclera. EOM intact, Eyelids are normal in appearance without swelling or lesions.  Cardiac: well perfused  Respiratory: breathing comfortably on room air. no audible wheezing or stridor  Abdominal: nondistended  MSK: Neck and back are without deformity, visible external skin changes, or signs of trauma. Curvature of the cervical, thoracic, and lumbar spine are within normal limits. no external signs of trauma. no apparent deficits in ROM of any extremity  Neurological: The patient is awake, alert and oriented to person, place, and time with normal speech. CN 2-12 grossly intact. no apparent deficits. Memory is normal and thought process is intact. No gait abnormalities are appreciated.   Psychiatric: Appropriate mood and affect. Good judgement and insight. No visual or auditory hallucinations.

## 2022-12-05 NOTE — ED ADULT TRIAGE NOTE - CHIEF COMPLAINT QUOTE
Pt c/o sore throat for several months. Denies cough, fever, n/v. Airway patent. Speaking in full complete sentences.

## 2022-12-05 NOTE — ED PROVIDER NOTE - CLINICAL SUMMARY MEDICAL DECISION MAKING FREE TEXT BOX
Consider tonsil stone. Unlikely infectious as symptoms have been present for months. Will test for infection and discharge with ENT f/u.

## 2022-12-05 NOTE — ED ADULT NURSE NOTE - CHPI ED NUR CONTEXT2
Robbin Frank,I saw this patient today.  She has the indwelling calcaneus hardware that has been stable.  I offered her to remove the hardware, which may allow her to get off antibiotics.  She doesn't want surgery to remove it at this time.[email or web address]
unknown

## 2022-12-05 NOTE — ED PROVIDER NOTE - NSFOLLOWUPCLINICS_GEN_ALL_ED_FT
[facility]  Otolaryngology (ENT)  [address], [address]  [place] [address]  Phone: [phone]  Fax:   Follow Up Time: 4-6 Days    [facility]  [facility] (ENT)  [address] [place] [address]  Phone: [phone]  Fax:   Follow Up Time: 4-6 Days

## 2022-12-05 NOTE — ED ADULT NURSE NOTE - OBJECTIVE STATEMENT
Patient c/o of throat discomfort ongoing X 2 months, no difficulty swallowing, states can just feel something at back of throat, was seen for same 2 months ago, referred to END and unable to see one due to insurance issues.  No fever/chills, no difficulty speaking in long sentences.

## 2022-12-05 NOTE — ED PROVIDER NOTE - PATIENT PORTAL LINK FT
You can access the FollowMyHealth Patient Portal offered by [facility] by registering at the following website: [email or web address]. By joining [employer]’s FollowMyHealth portal, you will also be able to view your health information using other applications (apps) compatible with our system.

## 2022-12-05 NOTE — ED ADULT NURSE NOTE - CAS DISCH ACCOMP BY
This note was copied from the mother's chart. Pt. Stated breastfeeding is going well. Pt. Denied any questions or concerns at this time. Encouraged pt. To call lactation with any questions or concerns.
Self

## 2022-12-06 DIAGNOSIS — J02.9 ACUTE PHARYNGITIS, UNSPECIFIED: ICD-10-CM

## 2022-12-06 DIAGNOSIS — R07.0 PAIN IN THROAT: ICD-10-CM

## 2022-12-07 LAB
CULTURE RESULTS: SIGNIFICANT CHANGE UP
SPECIMEN SOURCE: SIGNIFICANT CHANGE UP

## 2023-01-25 ENCOUNTER — EMERGENCY (EMERGENCY)
Facility: HOSPITAL | Age: 28
LOS: 1 days | Discharge: HOME | End: 2023-01-25
Admitting: STUDENT IN AN ORGANIZED HEALTH CARE EDUCATION/TRAINING PROGRAM
Payer: COMMERCIAL

## 2023-01-25 VITALS
WEIGHT: 192.9 LBS | RESPIRATION RATE: 16 BRPM | DIASTOLIC BLOOD PRESSURE: 80 MMHG | OXYGEN SATURATION: 96 % | HEART RATE: 105 BPM | TEMPERATURE: 98 F | SYSTOLIC BLOOD PRESSURE: 119 MMHG | HEIGHT: 69 IN

## 2023-01-25 LAB
ALBUMIN SERPL ELPH-MCNC: 4 G/DL — SIGNIFICANT CHANGE UP (ref 3.3–5)
ALP SERPL-CCNC: 65 U/L — SIGNIFICANT CHANGE UP (ref 40–120)
ALT FLD-CCNC: 13 U/L — SIGNIFICANT CHANGE UP (ref 10–45)
ANION GAP SERPL CALC-SCNC: 8 MMOL/L — SIGNIFICANT CHANGE UP (ref 5–17)
AST SERPL-CCNC: 17 U/L — SIGNIFICANT CHANGE UP (ref 10–40)
BILIRUB SERPL-MCNC: 0.5 MG/DL — SIGNIFICANT CHANGE UP (ref 0.2–1.2)
BUN SERPL-MCNC: 12 MG/DL — SIGNIFICANT CHANGE UP (ref 7–23)
CALCIUM SERPL-MCNC: 9.4 MG/DL — SIGNIFICANT CHANGE UP (ref 8.4–10.5)
CHLORIDE SERPL-SCNC: 106 MMOL/L — SIGNIFICANT CHANGE UP (ref 96–108)
CO2 SERPL-SCNC: 26 MMOL/L — SIGNIFICANT CHANGE UP (ref 22–31)
CREAT SERPL-MCNC: 0.81 MG/DL — SIGNIFICANT CHANGE UP (ref 0.5–1.3)
EGFR: 103 ML/MIN/1.73M2 — SIGNIFICANT CHANGE UP
GLUCOSE SERPL-MCNC: 105 MG/DL — HIGH (ref 70–99)
HIV 1+2 AB+HIV1 P24 AG SERPL QL IA: SIGNIFICANT CHANGE UP
POTASSIUM SERPL-MCNC: 4 MMOL/L — SIGNIFICANT CHANGE UP (ref 3.5–5.3)
POTASSIUM SERPL-SCNC: 4 MMOL/L — SIGNIFICANT CHANGE UP (ref 3.5–5.3)
PROT SERPL-MCNC: 7.3 G/DL — SIGNIFICANT CHANGE UP (ref 6–8.3)
SODIUM SERPL-SCNC: 140 MMOL/L — SIGNIFICANT CHANGE UP (ref 135–145)

## 2023-01-25 PROCEDURE — 87389 HIV-1 AG W/HIV-1&-2 AB AG IA: CPT

## 2023-01-25 PROCEDURE — 87591 N.GONORRHOEAE DNA AMP PROB: CPT

## 2023-01-25 PROCEDURE — 86780 TREPONEMA PALLIDUM: CPT

## 2023-01-25 PROCEDURE — 99284 EMERGENCY DEPT VISIT MOD MDM: CPT

## 2023-01-25 PROCEDURE — 86592 SYPHILIS TEST NON-TREP QUAL: CPT

## 2023-01-25 PROCEDURE — 99283 EMERGENCY DEPT VISIT LOW MDM: CPT

## 2023-01-25 PROCEDURE — 87491 CHLMYD TRACH DNA AMP PROBE: CPT

## 2023-01-25 PROCEDURE — 80053 COMPREHEN METABOLIC PANEL: CPT

## 2023-01-25 PROCEDURE — 36415 COLL VENOUS BLD VENIPUNCTURE: CPT

## 2023-01-25 PROCEDURE — 86593 SYPHILIS TEST NON-TREP QUANT: CPT

## 2023-01-25 RX ORDER — EMTRICITABINE AND TENOFOVIR DISOPROXIL FUMARATE 200; 300 MG/1; MG/1
1 TABLET, FILM COATED ORAL ONCE
Refills: 0 | Status: COMPLETED | OUTPATIENT
Start: 2023-01-25 | End: 2023-01-25

## 2023-01-25 RX ORDER — RALTEGRAVIR 400 MG/1
1 TABLET, FILM COATED ORAL
Qty: 56 | Refills: 0
Start: 2023-01-25 | End: 2023-02-21

## 2023-01-25 RX ORDER — EMTRICITABINE AND TENOFOVIR DISOPROXIL FUMARATE 200; 300 MG/1; MG/1
1 TABLET, FILM COATED ORAL
Qty: 28 | Refills: 0
Start: 2023-01-25 | End: 2023-02-21

## 2023-01-25 RX ORDER — RALTEGRAVIR 400 MG/1
400 TABLET, FILM COATED ORAL ONCE
Refills: 0 | Status: COMPLETED | OUTPATIENT
Start: 2023-01-25 | End: 2023-01-25

## 2023-01-25 RX ADMIN — RALTEGRAVIR 400 MILLIGRAM(S): 400 TABLET, FILM COATED ORAL at 20:34

## 2023-01-25 RX ADMIN — EMTRICITABINE AND TENOFOVIR DISOPROXIL FUMARATE 1 TABLET(S): 200; 300 TABLET, FILM COATED ORAL at 20:34

## 2023-01-25 NOTE — ED ADULT TRIAGE NOTE - CHIEF COMPLAINT QUOTE
Pt states she had vaginal intercourse last night with a partner who may have HIV. Requesting testing and PEP.

## 2023-01-25 NOTE — ED PROVIDER NOTE - CLINICAL SUMMARY MEDICAL DECISION MAKING FREE TEXT BOX
Afebrile and hemodynamically stable. She has possible HIV exposure within 72hr and would like to begin PEP. Given first dose in ED. Labs including CMP, UPT, HIV, GC/chlamydia and syphilis sent and pending. Pt reports she has previously been treated for syphilis and recently had downtrending titers. Counseled that she will need repeat HIV testing in 3 months. Will f/u with her OB/GYN. Return precautions given.

## 2023-01-25 NOTE — ED PROVIDER NOTE - PHYSICAL EXAMINATION
VITAL SIGNS: I have reviewed nursing notes and confirm.  CONSTITUTIONAL: Well-developed; in no acute distress.   SKIN:  warm and dry, no acute rash.   HEAD:  normocephalic, atraumatic.  EYES: PERRL, EOM intact; conjunctiva and sclera clear.  ENT: No nasal discharge; airway clear.   NECK: Supple; non tender.  EXT: Normal ROM. No clubbing, cyanosis or edema. 2+ pulses to b/l ue/le.  NEURO: Alert, oriented, grossly unremarkable  PSYCH: Cooperative, mood and affect appropriate.

## 2023-01-25 NOTE — ED PROVIDER NOTE - NSFOLLOWUPINSTRUCTIONS_ED_ALL_ED_FT
You were tested for HIV, syphilis, gonorrhea and chlamydia today.    You will need repeat HIV testing in 3 months.    You were given your first dose of post exposure prophylaxis in the Emergency Department.  Take one tab of truvada daily for 28 days.  Take one tab of isentress twice daily for 28 days.    Please follow up with your primary care doctor or OB/GYN.

## 2023-01-25 NOTE — ED PROVIDER NOTE - PATIENT PORTAL LINK FT
You can access the FollowMyHealth Patient Portal offered by [facility] by registering at the following website: [email or web address]. By joining [employer]’s FollowMyHealth portal, you will also be able to view your health information using other applications (apps) compatible with our system.

## 2023-01-25 NOTE — ED ADULT NURSE NOTE - OBJECTIVE STATEMENT
Pt received aaox3 requesting HIV testing s/p having vaginal intercourse with a partner last night who may have been +. Pt also requesting PEP. Pt denies any other medical complaints at this time.

## 2023-01-25 NOTE — ED PROVIDER NOTE - OBJECTIVE STATEMENT
28yo female presents requesting HIV testing and rx for PEP. Pt reports she had unprotected intercourse with a male partner ~72hr ago who subsequently informed her he had previously had intercourse with multiple men and may have been exposed to HIV. Pt currently asymptomatic but would like to begin post-exposure prophylaxis.

## 2023-01-27 LAB
RPR SER-TITR: (no result)
RPR SERPL-ACNC: REACTIVE
T PALLIDUM AB TITR SER: POSITIVE

## 2023-01-28 DIAGNOSIS — Z20.2 CONTACT WITH AND (SUSPECTED) EXPOSURE TO INFECTIONS WITH A PREDOMINANTLY SEXUAL MODE OF TRANSMISSION: ICD-10-CM

## 2023-04-11 ENCOUNTER — EMERGENCY (EMERGENCY)
Facility: HOSPITAL | Age: 28
LOS: 1 days | Discharge: HOME | End: 2023-04-11
Attending: EMERGENCY MEDICINE | Admitting: EMERGENCY MEDICINE
Payer: COMMERCIAL

## 2023-04-11 VITALS
HEART RATE: 78 BPM | TEMPERATURE: 98 F | RESPIRATION RATE: 18 BRPM | OXYGEN SATURATION: 100 % | SYSTOLIC BLOOD PRESSURE: 107 MMHG | DIASTOLIC BLOOD PRESSURE: 68 MMHG

## 2023-04-11 VITALS
RESPIRATION RATE: 18 BRPM | WEIGHT: 215.83 LBS | HEART RATE: 90 BPM | SYSTOLIC BLOOD PRESSURE: 119 MMHG | TEMPERATURE: 99 F | DIASTOLIC BLOOD PRESSURE: 80 MMHG | OXYGEN SATURATION: 98 % | HEIGHT: 67 IN

## 2023-04-11 DIAGNOSIS — R82.998 OTHER ABNORMAL FINDINGS IN URINE: ICD-10-CM

## 2023-04-11 LAB
APPEARANCE UR: ABNORMAL
BACTERIA # UR AUTO: PRESENT /HPF
BILIRUB UR-MCNC: NEGATIVE — SIGNIFICANT CHANGE UP
COLOR SPEC: YELLOW — SIGNIFICANT CHANGE UP
DIFF PNL FLD: NEGATIVE — SIGNIFICANT CHANGE UP
EPI CELLS # UR: SIGNIFICANT CHANGE UP /HPF (ref 0–5)
GLUCOSE UR QL: NEGATIVE — SIGNIFICANT CHANGE UP
HIV 1+2 AB+HIV1 P24 AG SERPL QL IA: SIGNIFICANT CHANGE UP
KETONES UR-MCNC: NEGATIVE — SIGNIFICANT CHANGE UP
LEUKOCYTE ESTERASE UR-ACNC: ABNORMAL
NITRITE UR-MCNC: NEGATIVE — SIGNIFICANT CHANGE UP
PH UR: 7 — SIGNIFICANT CHANGE UP (ref 5–8)
PROT UR-MCNC: ABNORMAL MG/DL
RBC CASTS # UR COMP ASSIST: < 5 /HPF — SIGNIFICANT CHANGE UP
SP GR SPEC: 1.02 — SIGNIFICANT CHANGE UP (ref 1–1.03)
UROBILINOGEN FLD QL: 0.2 E.U./DL — SIGNIFICANT CHANGE UP
WBC UR QL: < 5 /HPF — SIGNIFICANT CHANGE UP

## 2023-04-11 PROCEDURE — 36415 COLL VENOUS BLD VENIPUNCTURE: CPT

## 2023-04-11 PROCEDURE — 87591 N.GONORRHOEAE DNA AMP PROB: CPT

## 2023-04-11 PROCEDURE — 99284 EMERGENCY DEPT VISIT MOD MDM: CPT

## 2023-04-11 PROCEDURE — 99283 EMERGENCY DEPT VISIT LOW MDM: CPT

## 2023-04-11 PROCEDURE — 81001 URINALYSIS AUTO W/SCOPE: CPT

## 2023-04-11 PROCEDURE — 87389 HIV-1 AG W/HIV-1&-2 AB AG IA: CPT

## 2023-04-11 PROCEDURE — 87491 CHLMYD TRACH DNA AMP PROBE: CPT

## 2023-04-11 NOTE — ED PROVIDER NOTE - PATIENT PORTAL LINK FT
You can access the FollowMyHealth Patient Portal offered by [facility] by registering at the following website: [email or web address]. By joining [employer]’s FollowMyHealth portal, you will also be able to view your health information using other applications (apps) compatible with our system.

## 2023-04-11 NOTE — ED PROVIDER NOTE - CLINICAL SUMMARY MEDICAL DECISION MAKING FREE TEXT BOX
27-year-old female with history of foul-smelling urine that is cloudy for the past 3 weeks concerning for UTI and/or gonorrhea or chlamydia urethritis.  Patient has unremarkable examination, vitals are stable patient is afebrile and looks well.  Patient has no CVAT patient has no suprapubic tenderness.  Patient denies vaginal discharge.  Given recent sexual encounter the patient has concerns over will obtain HIV testing in addition to GC amplification test as well as urinalysis and urine pregnancy test and reassess.  Patient counseled on safe sex practices

## 2023-04-11 NOTE — ED PROVIDER NOTE - NSFOLLOWUPINSTRUCTIONS_ED_ALL_ED_FT
IT IS UNCLEAR WHAT IS CAUSING  YOUR SYMPTOMS.  YOUR URINE TEST TODAY DID NOT SHOW A UTI.  PLEASE INCREASE YOUR FLUID AND CRANBERRY JUICE INTAKE AND FOLLOW UP WITH YOUR GYNECOLOGIST.  YOUR TEST FOR HIV SHOULD BE BACK LATER THIS EVENING OR EARLY TOMORROW, AND YOUR URINE GONORRHEA AND CHLAMYDIA TESTS BY THE END OF THIS WEEK.

## 2023-04-11 NOTE — ED PROVIDER NOTE - OBJECTIVE STATEMENT
28 yo F presenting with cloudy and foul smelling urine x 3 weeks.  Had recent unprotected sex 2 months ago and finished PEP, now concerned about G/C and or UTI.   No fever, chills, rash, flank pain, n/v.

## 2023-05-17 ENCOUNTER — EMERGENCY (EMERGENCY)
Facility: HOSPITAL | Age: 28
LOS: 1 days | Discharge: HOME | End: 2023-05-17
Admitting: EMERGENCY MEDICINE
Payer: COMMERCIAL

## 2023-05-17 VITALS
DIASTOLIC BLOOD PRESSURE: 76 MMHG | SYSTOLIC BLOOD PRESSURE: 118 MMHG | WEIGHT: 192.9 LBS | OXYGEN SATURATION: 98 % | TEMPERATURE: 99 F | HEIGHT: 67 IN | HEART RATE: 89 BPM | RESPIRATION RATE: 18 BRPM

## 2023-05-17 PROCEDURE — 99283 EMERGENCY DEPT VISIT LOW MDM: CPT

## 2023-05-17 RX ORDER — ACETAMINOPHEN WITH CODEINE 300MG-30MG
2 TABLET ORAL
Qty: 30 | Refills: 0
Start: 2023-05-17

## 2023-05-17 NOTE — ED PROVIDER NOTE - NSFOLLOWUPINSTRUCTIONS_ED_ALL_ED_FT
Please elevate your legs, use ace wraps, ibuprofen as needed for pain.  Take antibiotics as prescribed for your infected right ear cyst, follow up with ENT for further management.

## 2023-05-17 NOTE — ED ADULT NURSE NOTE - NSFALLUNIVINTERV_ED_ALL_ED
Bed/Stretcher in lowest position, wheels locked, appropriate side rails in place/Call bell, personal items and telephone in reach/Instruct patient to call for assistance before getting out of bed/chair/stretcher/Non-slip footwear applied when patient is off stretcher/[place] to call system/Physically safe environment - no spills, clutter or unnecessary equipment/Purposeful proactive rounding/Room/bathroom lighting operational, light cord in reach

## 2023-05-17 NOTE — ED ADULT NURSE NOTE - OBJECTIVE STATEMENT
Pt arrived to ED c/o b/l ankle swelling and pain. Pt reports pain and swelling since 5/12. Pt also c/o right ear pain from a cyst.

## 2023-05-17 NOTE — ED PROVIDER NOTE - MUSCULOSKELETAL, MLM
b/l ankles with +mild prominence of lateral soft tissues, no bony tenderness, no warmth, no erythema, DP/PT 2+ b/l

## 2023-05-17 NOTE — ED PROVIDER NOTE - PATIENT PORTAL LINK FT
You can access the FollowMyHealth Patient Portal offered by [facility] by registering at the following website: [email or web address]. By joining [employer]’s FollowMyHealth portal, you will also be able to view your health information using other applications (apps) compatible with our system.

## 2023-05-17 NOTE — ED PROVIDER NOTE - OBJECTIVE STATEMENT
26 y/o f presents with multiple complaints.  Pt reports a cyst to right ear just by her earlobe which has gotten repeatedly infected and had to be drained in the past.  Pt stating she had surgery with an ENT 2 years ago, it has returned a few times since.  Pt reports the area to be painful/swollen for a few days.  Pt also reports mild swelling to the outside of both ankles for the past week.  Pt doesn't recall any specific injury to the area.  Denies fever, chills, numbness/tingling to ext, all other ROS negative.

## 2023-05-17 NOTE — ED ADULT NURSE NOTE - NS ED NURSE LEVEL OF CONSCIOUSNESS ORIENTATION
[FreeTextEntry1] : Hepatitis B vaccine given.\par F/U as needed. [] : The components of the vaccine(s) to be administered today are listed in the plan of care. The disease(s) for which the vaccine(s) are intended to prevent and the risks have been discussed with the caretaker.  The risks are also included in the appropriate vaccination information statements which have been provided to the patient's caregiver.  The caregiver has given consent to vaccinate. Oriented - self; Oriented - place; Oriented - time

## 2023-05-17 NOTE — ED PROVIDER NOTE - CLINICAL SUMMARY MEDICAL DECISION MAKING FREE TEXT BOX
28 y/o f presents c/o recurrent cyst to right ear, b/l ankle pain x 1 week.  Pt afebrile in ED, ambulatory, ext NVI.  Not concerned for bony injury, no indication for imaging of ankles, ace wraps applied, will recommend elevating, f/u pmd.  Will start on augmentin for infected cyst of ear, needs to f/u with ENT for possible drainage/removal, given return precautions for worsening symptoms

## 2023-05-19 DIAGNOSIS — M25.572 PAIN IN LEFT ANKLE AND JOINTS OF LEFT FOOT: ICD-10-CM

## 2023-05-19 DIAGNOSIS — R22.43 LOCALIZED SWELLING, MASS AND LUMP, LOWER LIMB, BILATERAL: ICD-10-CM

## 2023-05-19 DIAGNOSIS — M25.571 PAIN IN RIGHT ANKLE AND JOINTS OF RIGHT FOOT: ICD-10-CM

## 2023-05-19 DIAGNOSIS — Q18.1 PREAURICULAR SINUS AND CYST: ICD-10-CM

## 2023-09-19 ENCOUNTER — EMERGENCY (EMERGENCY)
Facility: HOSPITAL | Age: 28
LOS: 1 days | Discharge: HOME | End: 2023-09-19
Admitting: STUDENT IN AN ORGANIZED HEALTH CARE EDUCATION/TRAINING PROGRAM
Payer: COMMERCIAL

## 2023-09-19 VITALS
WEIGHT: 190.04 LBS | TEMPERATURE: 99 F | OXYGEN SATURATION: 98 % | RESPIRATION RATE: 18 BRPM | SYSTOLIC BLOOD PRESSURE: 107 MMHG | HEIGHT: 70 IN | DIASTOLIC BLOOD PRESSURE: 74 MMHG | HEART RATE: 72 BPM

## 2023-09-19 VITALS
RESPIRATION RATE: 17 BRPM | TEMPERATURE: 99 F | SYSTOLIC BLOOD PRESSURE: 107 MMHG | DIASTOLIC BLOOD PRESSURE: 74 MMHG | HEART RATE: 72 BPM | OXYGEN SATURATION: 98 %

## 2023-09-19 DIAGNOSIS — D23.22 OTHER BENIGN NEOPLASM OF SKIN OF LEFT EAR AND EXTERNAL AURICULAR CANAL: ICD-10-CM

## 2023-09-19 DIAGNOSIS — H92.02 OTALGIA, LEFT EAR: ICD-10-CM

## 2023-09-19 PROCEDURE — 99282 EMERGENCY DEPT VISIT SF MDM: CPT

## 2023-09-19 PROCEDURE — 99283 EMERGENCY DEPT VISIT LOW MDM: CPT

## 2023-09-19 NOTE — ED PROVIDER NOTE - CLINICAL SUMMARY MEDICAL DECISION MAKING FREE TEXT BOX
27 y/o f hx recurrent cysts to her ears presents c/o pain/swelling to left ear this week.  Pt afebrile, area mildly swollen, no pustule, multiple blackheads noted to entrance of ear canal.  Will rx doxycycline, refer to ENT, pt advised to return to ED if sx worsen.

## 2023-09-19 NOTE — ED PROVIDER NOTE - PATIENT PORTAL LINK FT
You can access the FollowMyHealth Patient Portal offered by [facility] by registering at the following website: [email or web address]. By joining [employer]’s FollowMyHealth portal, you will also be able to view your health information using other applications (apps) compatible with our system.

## 2023-09-19 NOTE — ED PROVIDER NOTE - CARE PROVIDER_API CALL
Elham Mayers  Otolaryngology  [address]  [place] [address]  Phone: [phone]  Fax: [phone]  Follow Up Time:

## 2023-09-19 NOTE — ED PROVIDER NOTE - OBJECTIVE STATEMENT
29 y/o f hx recurrent cyst to b/l ears presents c/o pain, swelling to left ear for the past few days, feels like her previous cyst is coming back.  Pt stating she has been seen several times in the ED, referred to ENT but typically the infection has resolved when she follows up and no definitive treatment has been done.  Denies fever, chills, all other ROS negative.

## 2023-09-19 NOTE — ED ADULT NURSE NOTE - OBJECTIVE STATEMENT
Pt. a&ox4 ambulatory with steady gait, hx of recurring BL ear cysts and infections, ENT hx of requiring tx ad drainage of the cysts, usually purulent in nature, comes to ED proactively as she begins to feel them reappear in her left ear. Pt. noticed 2x days ago, she had a swollen lymph node directly below the left ear, which has since resolved, however, cysts are painful for pt. to lay on that side @ night, and can be felt / seen on assessment. Pt. reports "I got them surgically drained 3 yrs ago, since then, they have been coming back every few months, I am put on amoxicillin, they disappear, and then come back a few months later". Denies systemic s/s of infection currently, denies f/c, changes to her balance, hearing, HA, dizziness or sore throat on that side.

## 2023-09-19 NOTE — ED ADULT NURSE NOTE - NSFALLUNIVINTERV_ED_ALL_ED
Bed/Stretcher in lowest position, wheels locked, appropriate side rails in place/Call bell, personal items and telephone in reach/Instruct patient to call for assistance before getting out of bed/chair/stretcher/Non-slip footwear applied when patient is off stretcher/[place] to call system/Physically safe environment - no spills, clutter or unnecessary equipment/Purposeful proactive rounding/Room/bathroom lighting operational, light cord in reach

## 2023-09-19 NOTE — ED ADULT TRIAGE NOTE - CHIEF COMPLAINT QUOTE
Pt presents to ED C/O reoccurring L ear pain. States, " I've had ENT surg in the past and was dx with multiple cyst. ear infection in both of my ears. Denies drainage from ear, fevers. hearing loss. States, " I can feel it growing back".

## 2023-09-19 NOTE — ED PROVIDER NOTE - ENMT, MLM
Airway patent, left ear +mild swelling just inside the tragus, no overlying erythema, canal patent with no edema, TM intact, no erythema or bulging

## 2023-09-22 NOTE — CONSULT NOTE ADULT - PROVIDER SPECIALTY LIST ADULT
GYN Assessment: Patient is a 68yoM s/p syncopal fall with acute T12-L1 disc space fracture. Unable to fit in MRI machine    Plan:    - Will plan for OR tomorrow with Dr. Hughes  - Please document medical optimization today  - NPO at midnight, IV F (if able to tolerate)  - CBC/BMP/Coags/Type & Screen at 0100 on 9/22    For all questions related to patient care, please reach out to the on-call team via the pager.     Venus Bravo, PGY 2  Orthopaedic Surgery  [facility] [phone]  [facility] [phone]  [facility] [phone]

## 2023-11-13 NOTE — ED ADULT NURSE NOTE - DISCHARGE DATE/TIME
Called and left message for patient to call and schedule an appt. Not seen since Feb >60 >60 mL/min/1.73m2 Final     Comment:             These results are not intended for use in patients <25years of age. eGFR results are calculated without a race factor using the 2021 CKD-EPI equation. Careful clinical correlation is recommended, particularly when comparing to results   calculated using previous equations. The CKD-EPI equation is less accurate in patients with extremes of muscle mass, extra-renal   metabolism of creatine, excessive creatine ingestion, or following therapy that affects   renal tubular secretion. CMP:  Sodium   Date Value Ref Range Status   06/30/2023 135 135 - 145 MMOL/L Final     Potassium   Date Value Ref Range Status   06/30/2023 4.1 3.5 - 5.1 MMOL/L Final     Chloride   Date Value Ref Range Status   06/30/2023 102 99 - 110 mMol/L Final     CO2   Date Value Ref Range Status   06/30/2023 25 21 - 32 MMOL/L Final     BUN   Date Value Ref Range Status   06/30/2023 13 6 - 23 MG/DL Final     Creatinine   Date Value Ref Range Status   06/30/2023 0.7 0.6 - 1.1 MG/DL Final     Glucose   Date Value Ref Range Status   06/30/2023 105 (H) 70 - 99 MG/DL Final     Calcium   Date Value Ref Range Status   06/30/2023 9.5 8.3 - 10.6 MG/DL Final     Total Protein   Date Value Ref Range Status   06/30/2023 7.4 6.4 - 8.2 GM/DL Final     Albumin   Date Value Ref Range Status   06/30/2023 4.3 3.4 - 5.0 GM/DL Final     Total Bilirubin   Date Value Ref Range Status   06/30/2023 0.3 0.0 - 1.0 MG/DL Final     Alkaline Phosphatase   Date Value Ref Range Status   06/30/2023 89 40 - 129 IU/L Final     AST   Date Value Ref Range Status   06/30/2023 30 15 - 37 IU/L Final     ALT   Date Value Ref Range Status   06/30/2023 34 10 - 40 U/L Final     Est, Glom Filt Rate   Date Value Ref Range Status   06/30/2023 >60 >60 mL/min/1.73m2 Final     Comment:             These results are not intended for use in patients <25years of age.           eGFR results are calculated without a race factor using 11-Apr-2023 16:59

## 2023-11-17 NOTE — ED PROVIDER NOTE - SKIN NEGATIVE STATEMENT, MLM
Addended by: WAQAR SANTIAGO on: 11/17/2023 09:52 AM     Modules accepted: Orders     no abrasions, no jaundice, no lesions, no pruritis, and no rashes.

## 2023-12-31 ENCOUNTER — EMERGENCY (EMERGENCY)
Facility: HOSPITAL | Age: 28
LOS: 1 days | Discharge: HOME | End: 2023-12-31
Attending: EMERGENCY MEDICINE | Admitting: EMERGENCY MEDICINE
Payer: COMMERCIAL

## 2023-12-31 VITALS
WEIGHT: 179.9 LBS | HEART RATE: 100 BPM | RESPIRATION RATE: 18 BRPM | SYSTOLIC BLOOD PRESSURE: 114 MMHG | DIASTOLIC BLOOD PRESSURE: 74 MMHG | OXYGEN SATURATION: 98 % | TEMPERATURE: 98 F

## 2023-12-31 VITALS
SYSTOLIC BLOOD PRESSURE: 101 MMHG | OXYGEN SATURATION: 99 % | TEMPERATURE: 98 F | RESPIRATION RATE: 17 BRPM | HEART RATE: 87 BPM | DIASTOLIC BLOOD PRESSURE: 67 MMHG

## 2023-12-31 DIAGNOSIS — R10.2 PELVIC AND PERINEAL PAIN: ICD-10-CM

## 2023-12-31 DIAGNOSIS — Z87.42 PERSONAL HISTORY OF OTHER DISEASES OF THE FEMALE GENITAL TRACT: ICD-10-CM

## 2023-12-31 DIAGNOSIS — R82.4 ACETONURIA: ICD-10-CM

## 2023-12-31 LAB
APPEARANCE UR: CLEAR — SIGNIFICANT CHANGE UP
APPEARANCE UR: CLEAR — SIGNIFICANT CHANGE UP
BACTERIA # UR AUTO: NEGATIVE /HPF — SIGNIFICANT CHANGE UP
BACTERIA # UR AUTO: NEGATIVE /HPF — SIGNIFICANT CHANGE UP
BILIRUB UR-MCNC: NEGATIVE — SIGNIFICANT CHANGE UP
BILIRUB UR-MCNC: NEGATIVE — SIGNIFICANT CHANGE UP
CAST: 1 /LPF — SIGNIFICANT CHANGE UP (ref 0–4)
CAST: 1 /LPF — SIGNIFICANT CHANGE UP (ref 0–4)
COLOR SPEC: YELLOW — SIGNIFICANT CHANGE UP
COLOR SPEC: YELLOW — SIGNIFICANT CHANGE UP
DIFF PNL FLD: NEGATIVE — SIGNIFICANT CHANGE UP
DIFF PNL FLD: NEGATIVE — SIGNIFICANT CHANGE UP
GLUCOSE UR QL: NEGATIVE MG/DL — SIGNIFICANT CHANGE UP
GLUCOSE UR QL: NEGATIVE MG/DL — SIGNIFICANT CHANGE UP
HIV 1+2 AB+HIV1 P24 AG SERPL QL IA: SIGNIFICANT CHANGE UP
HIV 1+2 AB+HIV1 P24 AG SERPL QL IA: SIGNIFICANT CHANGE UP
KETONES UR-MCNC: ABNORMAL MG/DL
KETONES UR-MCNC: ABNORMAL MG/DL
LEUKOCYTE ESTERASE UR-ACNC: ABNORMAL
LEUKOCYTE ESTERASE UR-ACNC: ABNORMAL
NITRITE UR-MCNC: NEGATIVE — SIGNIFICANT CHANGE UP
NITRITE UR-MCNC: NEGATIVE — SIGNIFICANT CHANGE UP
PH UR: 5.5 — SIGNIFICANT CHANGE UP (ref 5–8)
PH UR: 5.5 — SIGNIFICANT CHANGE UP (ref 5–8)
PROT UR-MCNC: SIGNIFICANT CHANGE UP MG/DL
PROT UR-MCNC: SIGNIFICANT CHANGE UP MG/DL
RBC CASTS # UR COMP ASSIST: 4 /HPF — SIGNIFICANT CHANGE UP (ref 0–4)
RBC CASTS # UR COMP ASSIST: 4 /HPF — SIGNIFICANT CHANGE UP (ref 0–4)
SP GR SPEC: 1.03 — SIGNIFICANT CHANGE UP (ref 1–1.03)
SP GR SPEC: 1.03 — SIGNIFICANT CHANGE UP (ref 1–1.03)
SQUAMOUS # UR AUTO: 4 /HPF — SIGNIFICANT CHANGE UP (ref 0–5)
SQUAMOUS # UR AUTO: 4 /HPF — SIGNIFICANT CHANGE UP (ref 0–5)
UROBILINOGEN FLD QL: 1 MG/DL — SIGNIFICANT CHANGE UP (ref 0.2–1)
UROBILINOGEN FLD QL: 1 MG/DL — SIGNIFICANT CHANGE UP (ref 0.2–1)
WBC UR QL: 5 /HPF — SIGNIFICANT CHANGE UP (ref 0–5)
WBC UR QL: 5 /HPF — SIGNIFICANT CHANGE UP (ref 0–5)

## 2023-12-31 PROCEDURE — 87389 HIV-1 AG W/HIV-1&-2 AB AG IA: CPT

## 2023-12-31 PROCEDURE — 87491 CHLMYD TRACH DNA AMP PROBE: CPT

## 2023-12-31 PROCEDURE — 99284 EMERGENCY DEPT VISIT MOD MDM: CPT

## 2023-12-31 PROCEDURE — 81001 URINALYSIS AUTO W/SCOPE: CPT

## 2023-12-31 PROCEDURE — 87591 N.GONORRHOEAE DNA AMP PROB: CPT

## 2023-12-31 PROCEDURE — 76856 US EXAM PELVIC COMPLETE: CPT | Mod: 26

## 2023-12-31 PROCEDURE — 76856 US EXAM PELVIC COMPLETE: CPT

## 2023-12-31 PROCEDURE — 76830 TRANSVAGINAL US NON-OB: CPT

## 2023-12-31 PROCEDURE — 76830 TRANSVAGINAL US NON-OB: CPT | Mod: 26

## 2023-12-31 PROCEDURE — 99284 EMERGENCY DEPT VISIT MOD MDM: CPT | Mod: 25

## 2023-12-31 PROCEDURE — 36415 COLL VENOUS BLD VENIPUNCTURE: CPT

## 2023-12-31 PROCEDURE — 96372 THER/PROPH/DIAG INJ SC/IM: CPT

## 2023-12-31 RX ORDER — CEFTRIAXONE 500 MG/1
500 INJECTION, POWDER, FOR SOLUTION INTRAMUSCULAR; INTRAVENOUS ONCE
Refills: 0 | Status: COMPLETED | OUTPATIENT
Start: 2023-12-31 | End: 2023-12-31

## 2023-12-31 RX ADMIN — CEFTRIAXONE 500 MILLIGRAM(S): 500 INJECTION, POWDER, FOR SOLUTION INTRAMUSCULAR; INTRAVENOUS at 15:17

## 2023-12-31 NOTE — ED ADULT NURSE NOTE - ALCOHOL PRE SCREEN (AUDIT - C)
Attempted to call Pt to inform of lab results. PT voicemail is not set up could not leave message to call back.    Statement Selected

## 2023-12-31 NOTE — ED PROVIDER NOTE - OBJECTIVE STATEMENT
27 y/o F with PMHx ovarian cysts presents to ED c/o discomfort to pelvis that has been intermittent over the past few weeks. Pain is described as a mild sensation across lower abdomen/pelvis region that is slightly relieved by moving bowels. No associated nausea, vomiting, diarrhea, constipation, fevers, or chills. Admits to occasional urinary frequency but no dysuria, hematuria, urinary urgency, flank pain, or abnormal vaginal discharge/odor. Pt had a small amount of vaginal bleeding after vigorous intercourse with her partner which since resolved a few days ago. Also endorses recently finding out her partner is sexually active with 9 other women without protection and would like to get tested for STDs. 29 y/o F with PMHx ovarian cysts presents to ED c/o discomfort to pelvis that has been intermittent over the past few weeks. Pain is described as a mild sensation across lower abdomen/pelvis region that is slightly relieved by moving bowels. No associated nausea, vomiting, diarrhea, constipation, fevers, or chills. Admits to occasional urinary frequency but no dysuria, hematuria, urinary urgency, flank pain, or abnormal vaginal discharge/odor. Pt had a small amount of vaginal bleeding after vigorous intercourse with her partner which since resolved a few days ago. Also endorses recently finding out her partner is sexually active with 9 other women without protection and would like to get tested for STDs. 29 y/o F with PMHx ovarian cysts presents to ED c/o discomfort to pelvis that has been intermittent over the past few weeks. Pain is described as a mild pressure sensation across pelvis that is slightly relieved by moving bowels. No associated nausea, vomiting, diarrhea, constipation, fevers, or chills. Admits to occasional urinary frequency but no dysuria, hematuria, urinary urgency, flank pain, or abnormal vaginal discharge/odor. Pt had a small amount of vaginal bleeding after vigorous intercourse with her partner few days ago which has since resolved. Also endorses recently finding out that her partner was sexually active with 9 other women without protection and would like to get tested for STDs.

## 2023-12-31 NOTE — ED PROVIDER NOTE - IV ALTEPLASE EXCL ABS HIDDEN
89 yo female with PMH as above admitted for hematuria. Patient has history of bladder ca, as per son Adama patient has history of bladder ca for which she had TURBT with Dr. Neri many years ago and then had recurrence of bladder ca and was getting bladder instillations which were stopped because patient was not able to tolerate and she was getting  "sick" from them as per pt's son. Pt's son states he is the HCP and reports patient and him do not want any invasive procedures including cystoscopy. Hematuria resolved. Patient underwent trial of void yesterday and has been voiding in the primafit, random bladder scan of 200 and 400 cc overnight.  Recommend  - F/U urine c/s and adjust abx accordingly  - Continue to check PVR/ bladder scan, if significant re insert verma  - Outpatient follow up with her own urologist or Dr. Hoyt    Case discussed with Dr. Hoyt      show

## 2023-12-31 NOTE — ED PROVIDER NOTE - NSFOLLOWUPINSTRUCTIONS_ED_ALL_ED_FT
Take doxycycline twice daily for the next 10 days.  Practice safe sex and use protection.  You will be contacted if your cultures are positive. If they are negative, you will not receive a call.   Follow up with your gyn for re-evaluation.  Return to er for any new or worsening symptoms.    Pelvic Pain, Female  Pelvic pain is pain in your lower belly (abdomen), below your belly button and between your hips. The pain may:  Start all of a sudden (be acute).  Keep coming back (be recurring).  Last a long time (become chronic). Pelvic pain that lasts longer than 6 months is called chronic pelvic pain.  There are many causes of pelvic pain. Sometimes the cause of pelvic pain is not known.    Follow these instructions at home:  Person sitting at a desk and writing in a notebook.  Take over-the-counter and prescription medicines only as told by your doctor.  Rest as told by your doctor.  Do not have sex if it hurts.  Keep a journal of your pelvic pain. Write down:  When the pain started.  Where the pain is located.  What seems to make the pain better or worse, such as food or your monthly period (menstrual cycle).  Any symptoms you have along with the pain.  Keep all follow-up visits.  Contact a doctor if:  Medicine does not help your pain, or your pain comes back.  You have new symptoms.  You have unusual discharge or bleeding from your vagina.  You have a fever or chills.  You are having trouble pooping (constipation).  You have blood in your pee (urine) or poop (stool).  Your pee smells bad.  You feel weak or light-headed.  Get help right away if:  You have sudden pain that is very bad.  You have very bad pain and also have any of these symptoms:  A fever.  Feeling like you may vomit (nauseous).  Vomiting.  Being very sweaty.  You faint.  These symptoms may be an emergency. Get help right away. Call your local emergency services (911 in the [place]  Do not wait to see if the symptoms will go away.  Do not drive yourself to the hospital.  Summary  Pelvic pain is pain in your lower belly (abdomen), below your belly button and between your hips.  There are many causes of pelvic pain.  Keep a journal of your pelvic pain.  This information is not intended to replace advice given to you by your health care provider. Make sure you discuss any questions you have with your health care provider.    Document Revised: 04/26/2022 Document Reviewed: 04/26/2022  Elsevier Patient Education © 2023 [employer] Inc[employer]

## 2023-12-31 NOTE — ED ADULT NURSE NOTE - NSFALLUNIVINTERV_ED_ALL_ED
Bed/Stretcher in lowest position, wheels locked, appropriate side rails in place/Call bell, personal items and telephone in reach/Instruct patient to call for assistance before getting out of bed/chair/stretcher/Non-slip footwear applied when patient is off stretcher/[place] to call system/Physically safe environment - no spills, clutter or unnecessary equipment/Purposeful proactive rounding/Room/bathroom lighting operational, light cord in reach Bed/Stretcher in lowest position, wheels locked, appropriate side rails in place/Call bell, personal items and telephone in reach/Instruct patient to call for assistance before getting out of bed/chair/stretcher/Non-slip footwear applied when patient is off stretcher/[place] to call system/Physically safe environment - no spills, clutter or unnecessary equipment/Purposeful proactive rounding/Room/bathroom lighting operational, light cord in reach

## 2023-12-31 NOTE — ED ADULT NURSE NOTE - OBJECTIVE STATEMENT
Pt A&Ox4 and able to speak in complete sentences. Pt breathing even and unlabored equal chest rise and fall. Pt arrived d/t pelvic discomfort and pain. pt denies cp, n, v, lightheadedness, dizziness, sob, fevers, chills. pt endorsing dysuria, flank pain, vaginal discharge.

## 2023-12-31 NOTE — ED PROVIDER NOTE - CLINICAL SUMMARY MEDICAL DECISION MAKING FREE TEXT BOX
Impression: h/o ovarian cysts presents with several weeks of intermittent pelvic pain with occasional urinary frequency. No vaginal discharge/odor or bleeding other than Impression: h/o ovarian cysts presents with several weeks of intermittent pelvic pain with occasional urinary frequency. No vaginal discharge/odor or bleeding other than postcoital bleeding a few days ago after vigorous intercourse. No concern for PID at this time. Will check for STIs including gc chlamydia and HIV. Will start doxycycline, ceftriaxone, and arrange for pelvic US to assess ovarian cysts and fibroids. Pt is declining pain meds at this time. Will continue to monitor. Impression: h/o ovarian cysts presents with several weeks of intermittent pelvic discomfort, with occasional urinary frequency. No vaginal discharge/odor or bleeding other than postcoital bleeding a few days ago after vigorous intercourse. No cmt/ chandelier sign; no concern for PID at this time. Will check for STIs including gc chlamydia and HIV. Will give ceftriaxone, and arrange for pelvic US to assess for ovarian cysts/ fibroids. TOA/ torsion less likely. Pt is declining pain meds at this time. Will continue to monitor.    Urine preg neg. UA neg for infection, + trace ketones. Pelvic us wnl. HIV test also neg. ED evaluation and management discussed with the patient in detail. Will send rx for doxy to pharmacy. Close gyn follow up encouraged.  Strict ED return instructions discussed in detail and patient given the opportunity to ask any questions about their discharge diagnosis and instructions. Patient verbalized understanding.

## 2023-12-31 NOTE — ED PROVIDER NOTE - PHYSICAL EXAMINATION
VITAL SIGNS: I have reviewed nursing notes and confirm.  CONSTITUTIONAL: Well appearing, in no acute distress.   SKIN:  warm and dry, no acute rash.   HEAD:  normocephalic, atraumatic.  EYES: EOM intact; conjunctiva and sclera clear.  ENT: No nasal discharge; airway clear.   NECK: Supple.  CARD: S1, S2, Regular rate and rhythm.   RESP:  Clear to auscultation b/l, no wheezes, rales or rhonchi.  ABD: Normal bowel sounds; soft; non-distended; non-tender; no guarding/ rebound.  PELVIC: No vaginal lacerations/ lesions. No blood/ dc. Os closed. No cmt/ adnexal tenderness/ masses.   EXT: Normal ROM. No peripheral edema. Pulses intact and equal b/l.  NEURO: Alert, oriented, grossly unremarkable  PSYCH: Anxious appearing.

## 2023-12-31 NOTE — ED PROVIDER NOTE - PATIENT PORTAL LINK FT
You can access the FollowMyHealth Patient Portal offered by [facility] by registering at the following website: [email or web address]. By joining [employer]’s FollowMyHealth portal, you will also be able to view your health information using other applications (apps) compatible with our system. You can access the FollowMyHealth Patient Portal offered by [facility] by registering at the following website: [email or web address]. By joining [employer]’s FollowMyHealth portal, you will also be able to view your health information using other applications (apps) compatible with our system.

## 2023-12-31 NOTE — ED PROVIDER NOTE - NS ED ROS FT
VITAL SIGNS: I have reviewed nursing notes and confirm.  CONSTITUTIONAL: Well appearing, in no acute distress.   SKIN:  warm and dry, no acute rash.   HEAD:  normocephalic, atraumatic.  EYES: EOM intact; conjunctiva and sclera clear.  ENT: No nasal discharge; airway clear.   NECK: Supple; non tender.  CARD: S1, S2 normal; no murmurs, gallops, or rubs. Regular rate and rhythm.   RESP:  Clear to auscultation b/l, no wheezes, rales or rhonchi.  ABD: Normal bowel sounds; soft; non-distended; non-tender; no guarding/ rebound.  : No blood or discharge on pelvic exam. Os is closed. No CMT, adnexal tenderness, or masses.   EXT: Normal ROM. No clubbing, cyanosis or edema. 2+ pulses to b/l ue/le.  NEURO: Alert, oriented, grossly unremarkable  PSYCH: Cooperative, mood and affect appropriate.

## 2024-01-02 LAB
C TRACH RRNA SPEC QL NAA+PROBE: SIGNIFICANT CHANGE UP
C TRACH RRNA SPEC QL NAA+PROBE: SIGNIFICANT CHANGE UP
N GONORRHOEA RRNA SPEC QL NAA+PROBE: SIGNIFICANT CHANGE UP
N GONORRHOEA RRNA SPEC QL NAA+PROBE: SIGNIFICANT CHANGE UP
SPECIMEN SOURCE: SIGNIFICANT CHANGE UP
SPECIMEN SOURCE: SIGNIFICANT CHANGE UP

## 2024-07-21 ENCOUNTER — EMERGENCY (EMERGENCY)
Facility: HOSPITAL | Age: 29
LOS: 1 days | Discharge: HOME | End: 2024-07-21
Payer: MEDICAID

## 2024-07-21 VITALS
RESPIRATION RATE: 18 BRPM | HEART RATE: 80 BPM | OXYGEN SATURATION: 99 % | TEMPERATURE: 98 F | HEIGHT: 70 IN | WEIGHT: 190.04 LBS | SYSTOLIC BLOOD PRESSURE: 110 MMHG | DIASTOLIC BLOOD PRESSURE: 76 MMHG

## 2024-07-21 PROCEDURE — 29515 APPLICATION SHORT LEG SPLINT: CPT | Mod: RT

## 2024-07-21 PROCEDURE — 99284 EMERGENCY DEPT VISIT MOD MDM: CPT | Mod: 25

## 2024-07-21 PROCEDURE — 73610 X-RAY EXAM OF ANKLE: CPT

## 2024-07-21 PROCEDURE — 73630 X-RAY EXAM OF FOOT: CPT

## 2024-07-21 PROCEDURE — 73610 X-RAY EXAM OF ANKLE: CPT | Mod: 26,RT

## 2024-07-21 PROCEDURE — 73630 X-RAY EXAM OF FOOT: CPT | Mod: 26,RT

## 2024-07-21 RX ORDER — ACETAMINOPHEN 325 MG
650 TABLET ORAL ONCE
Refills: 0 | Status: COMPLETED | OUTPATIENT
Start: 2024-07-21 | End: 2024-07-21

## 2024-07-21 RX ADMIN — Medication 600 MILLIGRAM(S): at 21:32

## 2024-07-21 RX ADMIN — Medication 600 MILLIGRAM(S): at 22:40

## 2024-07-21 RX ADMIN — Medication 650 MILLIGRAM(S): at 22:40

## 2024-07-21 RX ADMIN — Medication 650 MILLIGRAM(S): at 21:32

## 2024-07-21 NOTE — ED PROVIDER NOTE - NSFOLLOWUPCLINICS_GEN_ALL_ED_FT
[facility]  Podiatry/Wound Care  [address] [place] [address]  Phone: [phone]  Fax: [phone]

## 2024-07-21 NOTE — ED PROVIDER NOTE - PATIENT PORTAL LINK FT
You can access the FollowMyHealth Patient Portal offered by [facility] by registering at the following website: [email or web address]. By joining [employer]’s FollowMyHealth portal, you will also be able to view your health information using other applications (apps) compatible with our system.

## 2024-07-21 NOTE — ED ADULT TRIAGE NOTE - ACCOMPANIED BY
[facility] Hospital Medicine  Progress Note    Patient Name: Mitzy Perez  MRN: [id number]  Patient Class: OP- Observation   Admission Date: 9/8/2018  Length of Stay: 0 days  Attending Physician: Karyna Vaughn MD  Primary Care Provider: Marta Michael MD    [facility] Medicine Team: [facility] HOSP MED F Erika Saldana PA-C    Subjective:     Principal Problem:Fall    HPI:  86 year old female with a PMHx of HTN, macular degeneration, and recent CVA (admitted 9/5-9/7) presenting with L ankle pain s/p mechanical fall at home. Pt was just discharged from [facility] yesterday after admission for CVA. During that admission, recommendations were for patient to go to inpatient rehab; family opted for patient to return home with [facility] and -7 care. Pt arrived home ~7pm when she went to the bathroom. Per granddaughter, pt did not call for assistance. While in the bathroom, patient slipped while reaching to put toilet seat down. Pt denies prodromal symptoms, including chest pain, SOB, lightheadedness, vision/speech changes. She denies LOC and head injury. Family reports they heard her fall and came to her aid immediately. EMS called to transport her to hospital. At the time of my exam, pt only endorses minimal pain to LLE. Denies chest pain, SOB, abdominal pain, N/V/D, fever/chills, vision/speech changes.       In the ED, HDS. Imaging revealed displaced fracture of the lateral malleolus with anterior and medial dislocation of the tibia. Ortho surgery reduced and splinted in the ER; rec NWB LLE; plan for outpatient surgery. Patient admitted for rehab placement.    Hospital Course:  Pt admitted for rehab placement after mechanical fall at home resulting in trimalleolar fracture of L ankle. Ortho surg rec NWB LLE, ice/elevation- typically this fracture is treated operatively, however, given recent stroke and advanced age, will consider further. PT/OT/SLP consulted. Anticipate discharge to [facility] 9/10. Per ortho  surg, tentative plan for OR Friday and will check in on Thursday at rehab.    Interval History: No acute events overnight. This AM, pt sleeping in bed with granddaughter at bedside. Pt easily aroused, alert and oriented. Pt reports minimal pain to LLE. Discussed plan of care with patient and granddaughter.     Review of Systems   Constitutional: Positive for fatigue. Negative for chills, diaphoresis and fever.   Eyes: Positive for visual disturbance (chronic; no changes).   Respiratory: Negative for cough, chest tightness, shortness of breath and wheezing.    Cardiovascular: Negative for chest pain, palpitations and leg swelling.   Gastrointestinal: Negative for abdominal distention, abdominal pain, diarrhea, nausea and vomiting.   Musculoskeletal: Positive for myalgias. Negative for back pain and gait problem.        L ankle pain   Neurological: Negative for dizziness, seizures, syncope, speech difficulty, light-headedness, numbness and headaches.   Psychiatric/Behavioral: Negative for agitation, confusion, dysphoric mood and hallucinations. The patient is not nervous/anxious.      Objective:     Vital Signs (Most Recent):  Temp: 98.5 °F (36.9 °C) (09/09/18 0816)  Pulse: (!) 54 (09/09/18 0816)  Resp: 18 (09/09/18 0816)  BP: 139/63 (09/09/18 0816)  SpO2: (!) 93 % (09/09/18 0816) Vital Signs (24h Range):  Temp:  [97.9 °F (36.6 °C)-98.6 °F (37 °C)] 98.5 °F (36.9 °C)  Pulse:  [54-64] 54  Resp:  [16-18] 18  SpO2:  [91 %-95 %] 93 %  BP: (108-140)/(54-63) 139/63        Body mass index is 37.31 kg/m².    Intake/Output Summary (Last 24 hours) at 9/9/2018 1148  Last data filed at 9/9/2018 0602  Gross per 24 hour   Intake 255 ml   Output --   Net 255 ml      Physical Exam   Constitutional: She is oriented to person, place, and time. She appears well-developed and well-nourished. No distress.   Cardiovascular: Normal rate, regular rhythm, normal heart sounds and intact distal pulses.   Pulmonary/Chest: Effort normal and breath  sounds normal. No stridor. No respiratory distress.   Abdominal: Soft. Bowel sounds are normal. She exhibits no distension. There is no tenderness.   Musculoskeletal:   LLE splinted; CDI; sensation intact; able to move toes   Neurological: She is alert and oriented to person, place, and time. No cranial nerve deficit.   Skin: Skin is warm and dry. She is not diaphoretic. No erythema.   Psychiatric: She has a normal mood and affect. Her behavior is normal.       Significant Labs: All pertinent labs within the past 24 hours have been reviewed.    Significant Imaging: I have reviewed all pertinent imaging results/findings within the past 24 hours.    Assessment/Plan:      * Fall    Closed displaced fracture of L lateral malleolus  S/p mechanical fall at home  - Discharged from [facility] yesterday after admission for CVA (9/5-9/7). During that admission, recommendations were for patient to go to inpatient rehab; family opted for patient to return home with [facility] and [facility]  - Mechanical fall while attempting to go to bathroom without assistance after arriving home  - Imaging revealed displaced fracture of L lateral malleolus with anterior and medial dislocation of the tibia  - Ortho surgery reduced and splinted in the ER; rec NWB LLE and initial plan for outpatient surgery  - Per ortho surg, typically this fracture is treated operatively, however, given recent stroke and advanced age, will consider further  - Cautious use of opioids in elderly, analgesics PRN  - Family in agreement with patient going to Rehab for continued therapy. Family would like [facility]  - PT/OT/SLP consulted  - Anticipate discharge to [facility] 9/10. Per ortho surg, tentative plan for OR Friday and will check in on Thursday at rehab.    Pt is intermediate risk for a low risk surgery.  Would recommend continuing ASA and Coreg through surgery. Would also recommend regional anesthesia > general anesthesia if possible.         Acute ischemic  multifocal multiple vascular territories stroke    HLD  - Admitted 9/5-9/7 for acute CVA  - Continue home ASA and lipitor 40 mg daily  - PT/OT/SLP        Essential hypertension    - Elevated on arrival in setting of pain and missed AM meds  - Resume home regimen: Norvasc 5 mg daily, coreg 6.25 mg BID, lisinopril 20 mg daily  - Continue to monitor          VTE Risk Mitigation (From admission, onward)        Ordered     enoxaparin injection 30 mg  Daily      09/09/18 1300     IP VTE HIGH RISK PATIENT  Once      09/08/18 0515              Erika Saldana PA-C  Department of Hospital Medicine   [facility]  Discussed with staff: Dr. Vaughn   EMT/paramedic

## 2024-07-21 NOTE — ED ADULT NURSE NOTE - NSFALLRISKINTERV_ED_ALL_ED
Assistance OOB with selected safe patient handling equipment if applicable/Assistance with ambulation/Communicate fall risk and risk factors to all staff, patient, and family/Monitor gait and stability/Provide visual cue: yellow wristband, yellow gown, etc/Reinforce activity limits and safety measures with patient and family/Call bell, personal items and telephone in reach/Instruct patient to call for assistance before getting out of bed/chair/stretcher/Non-slip footwear applied when patient is off stretcher/[place] to call system/Physically safe environment - no spills, clutter or unnecessary equipment/Purposeful Proactive Rounding/Room/bathroom lighting operational, light cord in reach

## 2024-07-21 NOTE — ED ADULT NURSE NOTE - OBJECTIVE STATEMENT
AOX4 patient reports R ankle pain s/p twisting while getting off the bus. Noted with mild swelling on the lateral ankle. +pulses Unable to ambulate on affected foot.

## 2024-07-21 NOTE — ED PROVIDER NOTE - PHYSICAL EXAMINATION
Right ankle limited range of motion.  Swelling and tenderness to the lateral malleolar area.  Right foot there is mild swelling and tenderness to the midfoot, fifth metatarsal and fifth digit.  Cap refill less than 2 seconds.  DP/PT pulses intact 2+ bilaterally.  Achilles grossly intact and nontender.  Right knee full range of motion without swelling or bony tenderness.  Calf compartment soft and nontender.

## 2024-07-21 NOTE — ED PROVIDER NOTE - NSFOLLOWUPINSTRUCTIONS_ED_ALL_ED_FT
Follow-up with the podiatry clinic within 1 week.  Do not remove the splint until your follow-up visit.  For pain you can take over the counter Ibuprofen 600 mg orally every 6 hours as needed for pain. Take medication with food.     If you experience any new or worsening symptoms or if you are concerned you can always come back to the emergency for a re-evaluation.  Some results may not be available at the time of your discharge from the hospital. You can download the FOLLOW MY HEALTH flor and have access to these results.  **Official radiology report by the radiologist will be available within 24 hours. If there is a discrepancy you will contacted.

## 2024-07-21 NOTE — ED ADULT NURSE NOTE - MUSCLE PAIN OR WEAKNESS
We are committed to providing our patients with their test results in a timely manner. If you have access to [place], you will receive your results within 5 to 7 days. If your results are normal, a member of our office may call you or you may receive a letter in the mail within 10 to 15 days of your testing. If your results have something additional to discuss, a member of our office will contact you by phone. If at any time you have questions related your results, please feel free to call our office at [phone]  
pain on R ankle/yes

## 2024-07-21 NOTE — ED PROVIDER NOTE - CLINICAL SUMMARY MEDICAL DECISION MAKING FREE TEXT BOX
28-year-old female, presents for evaluation status post right ankle and foot injury earlier today.  Neurovascularly intact.  X-ray shows no fracture or dislocation.  Differential diagnosis includes but not limited to sprain, ligamentous tear.  At this time ankle mortise is stable.  No Achilles tenderness.  Posterior splint applied.  Crutches and outpatient follow-up with podiatry clinic.

## 2024-07-21 NOTE — ED PROVIDER NOTE - PROGRESS NOTE DETAILS
Pt is well appearing stable for discharge and follow up without fail with medical doctor. I had a detailed discussion with the patient and/or guardian regarding the historical points, exam findings, and any diagnostic results supporting the discharge diagnosis. Pt educated on care and need for follow up. Strict return instructions and red flag signs and symptoms discussed with patient. Questions answered. Pt shows understanding of discharge information and agrees to follow.

## 2024-07-21 NOTE — ED PROVIDER NOTE - OBJECTIVE STATEMENT
28 year-old female, no significant PMHx, presents after R ankle injury earlier today when she stepped in a pothole. Unsure how she twisted ankle. Felt "pop" sensation. After was unable to walk on it. Now reports sharp pain to lateral aspect of ankle/foot extending to pinky toe. Pain worse with movement. No alleviating factors. Denies any fall to the ground. No numbness, tingling, focal weakness or any other complaints.

## 2024-08-26 NOTE — ED ADULT NURSE NOTE - NURSING MUSC ROM
[General Appearance - Alert] : alert [General Appearance - In No Acute Distress] : in no acute distress [Neck Appearance] : the appearance of the neck was normal [Neck Cervical Mass (___cm)] : no neck mass was observed [Jugular Venous Distention Increased] : there was no jugular-venous distention [Thyroid Diffuse Enlargement] : the thyroid was not enlarged [Thyroid Nodule] : there were no palpable thyroid nodules [Auscultation Breath Sounds / Voice Sounds] : lungs were clear to auscultation bilaterally [Heart Rate And Rhythm] : heart rate was normal and rhythm regular [Heart Sounds] : normal S1 and S2 [Heart Sounds Gallop] : no gallops [Murmurs] : no murmurs [Heart Sounds Pericardial Friction Rub] : no pericardial rub [Edema] : there was no peripheral edema [Bowel Sounds] : normal bowel sounds [Abdomen Soft] : soft [Abdomen Tenderness] : non-tender [] : no hepato-splenomegaly [Abdomen Mass (___ Cm)] : no abdominal mass palpated [Cervical Lymph Nodes Enlarged Posterior Bilaterally] : posterior cervical - - - [Cervical Lymph Nodes Enlarged Anterior Bilaterally] : anterior cervical [Supraclavicular Lymph Nodes Enlarged Bilaterally] : supraclavicular

## 2025-02-25 NOTE — ED PROVIDER NOTE - MDM ORDERS SUBMITTED SELECTION
Detail Level: Generalized Detail Level: Simple Detail Level: Zone Detail Level: Detailed Imaging Studies/Medications

## 2025-05-16 NOTE — ED ADULT TRIAGE NOTE - NS ED NURSE BANDS TYPE
Name band;
[FreeTextEntry1] : Primary care physician: Dr Haleh Mohseni, [facility].   05/16/2025:  86-year-old male presents for follow-up.  Patient was recently seen for UTI.  After completion of antibiotics, patient developed flank pain.   Had called office and was asked to get RBUS, which he did.   Taking Silodosin. Same urination. No change.   Patient does have on-and-off history of constipation and does take stool softeners.   04/24/2025: 86-year-old male presents for UTI.  Patient accompanied his wife.   Yesterday, patient developed frequency, urgency, urinary incontinence, dysuria, subjective fever, chills, and rigors.   Presented to [facility] in [place]. After having urine checked, was prescribed Cefpodoxime.  Patient has had two doses and is feeling better now.   Denies any sexual activity before the episode.   Taking Silodosin 4 mg. Up until this episode, urination was the same and there was no change: urinated 3-4 times during the day with no nocturia. Denied urinary incontinence.  03/17/2025:  Urinalysis: Negative  Creatinine: 0.77    01/21/2025: PSA: 0.26, was 0.27 on 03/17/2025.   A1c: 7.4   Seen on 01/16/2025 for follow-up.  On Silodosin 4 mg, took around 3:00 PM.   Still had on-and-off dizziness. Patient is following with ENT.  No change in urination. Not bothered.  Patient wondering if he can discontinue Silodosin.   In the past: On Silodosin 4 mg.  Had less complain of on and off dizziness, heaviness, and right ear pain.  Had auditory canal lesion, followed with ENT.  Had reasonable stream, daytime frequency 4 to 5 x and nocturia 1 x.  Denied hesitancy, straining, intermittency, urgency, incontinence or sense of incomplete emptying.  With Constipation urination worsened.   Followed with Vascular surgeon for AAA.   Seen on 2/15/24 for testis pain. Went to [facility] ED on 2/12/24 with complaint of right testis pain radiating to back.  Had labs, scrotal Ultrasound and CT scan. 2/12/2024: WBC: 8.3 Creatinine: 0.92 UA: Negative, >1000 glucose Urine culture: <10,000 CFU/mL Normal Urogenital Yocasta Scrotal ultrasound: No acute finding CT Angio: KIDNEYS/URETERS: Within normal limits. BLADDER: Within normal limits. REPRODUCTIVE ORGANS: Prostate not enlarged. BOWEL: Possible small hiatal hernia. Colonic diverticulosis, without CT evidence of diverticulitis. No bowel obstruction Appendix is normal. PERITONEUM: No ascites. VESSELS: Atherosclerotic changes of the abdominal aorta, including the origin of the celiac artery and SMA, which are patent. There are saccular or ulcer like projections of the infrarenal abdominal aorta with localized mural thrombus. Aneurysmal dilatation measuring up to 4.0 cm in transverse dimension. No aortic rupture/leak is noted. The bilateral common iliac, external iliac and femoral arteries are patent. RETROPERITONEUM/LYMPH NODES: No lymphadenopathy. ABDOMINAL WALL: Small right lateral fat-containing inguinal hernias.  In the past: Had on and off bilateral groin pain at night, resolved by morning.  Patient concerned about Prostate cancer.   Seen on 7/20/2023 for flank pain.  Had developed severe right flank pain and testis pain, next day went to [facility] emergency department. Had renal and Bladder Ultrasound and Scrotal Ultrasound. No longer had flank or testicular pain. No personal history or family history of kidney stone.  Had Constipation took Ayurvedic medicine. Had no change in urination.   Seen on 7/13/2023. On Silodosin 8 mg, daytime frequency 3 to 4 x and no nocturia.  Denied hesitancy, straining, intermittency, urgency, incontinence. sense of incomplete emptying and post void dribbling  Started with good flow and then tapers.   Seen on 2/16/23: Changed Alfuzosin to Silodosin.   Seen on 2/10/23. Urine culture (2/7/23): Proteus mirabilis. Changed to Augmentin suspension.  Seen on 2/7/23.  Restarted Alfuzosin. Started Augmentin for presumed urinary tract infection.   Seen on 7/15/21 for lower urinary tract symptoms.  Reported reasonable stream, urinates 3-4 x or so during the day. Nocturia of 0-1 x.  Endorsed off and on hesitancy and straining especially with Constipation.  Denied dysuria, hematuria, lower abdominal or flank pain, fever, chills or rigors.  Has off and on redness of penis.  Has been having bilateral lower extremity edema.  Saw Dr Dailey in the past.  Saw me at [facility] for Epididymo-orchitis. 
[FreeTextEntry1] : Primary care physician: Dr Haleh Mohseni, [facility].   05/16/2025:  86-year-old male presents for follow-up.  Patient was recently seen for UTI.  After completion of antibiotics, patient developed flank pain.   Had called office and was asked to get RBUS, which he did.   Taking Silodosin. Same urination. No change.   Patient does have on-and-off history of constipation and does take stool softeners.   04/24/2025: 86-year-old male presents for UTI.  Patient accompanied his wife.   Yesterday, patient developed frequency, urgency, urinary incontinence, dysuria, subjective fever, chills, and rigors.   Presented to [facility] in [place]. After having urine checked, was prescribed Cefpodoxime.  Patient has had two doses and is feeling better now.   Denies any sexual activity before the episode.   Taking Silodosin 4 mg. Up until this episode, urination was the same and there was no change: urinated 3-4 times during the day with no nocturia. Denied urinary incontinence.  03/17/2025:  Urinalysis: Negative  Creatinine: 0.77    01/21/2025: PSA: 0.26, was 0.27 on 03/17/2025.   A1c: 7.4   Seen on 01/16/2025 for follow-up.  On Silodosin 4 mg, took around 3:00 PM.   Still had on-and-off dizziness. Patient is following with ENT.  No change in urination. Not bothered.  Patient wondering if he can discontinue Silodosin.   In the past: On Silodosin 4 mg.  Had less complain of on and off dizziness, heaviness, and right ear pain.  Had auditory canal lesion, followed with ENT.  Had reasonable stream, daytime frequency 4 to 5 x and nocturia 1 x.  Denied hesitancy, straining, intermittency, urgency, incontinence or sense of incomplete emptying.  With Constipation urination worsened.   Followed with Vascular surgeon for AAA.   Seen on 2/15/24 for testis pain. Went to [facility] ED on 2/12/24 with complaint of right testis pain radiating to back.  Had labs, scrotal Ultrasound and CT scan. 2/12/2024: WBC: 8.3 Creatinine: 0.92 UA: Negative, >1000 glucose Urine culture: <10,000 CFU/mL Normal Urogenital Yocasta Scrotal ultrasound: No acute finding CT Angio: KIDNEYS/URETERS: Within normal limits. BLADDER: Within normal limits. REPRODUCTIVE ORGANS: Prostate not enlarged. BOWEL: Possible small hiatal hernia. Colonic diverticulosis, without CT evidence of diverticulitis. No bowel obstruction Appendix is normal. PERITONEUM: No ascites. VESSELS: Atherosclerotic changes of the abdominal aorta, including the origin of the celiac artery and SMA, which are patent. There are saccular or ulcer like projections of the infrarenal abdominal aorta with localized mural thrombus. Aneurysmal dilatation measuring up to 4.0 cm in transverse dimension. No aortic rupture/leak is noted. The bilateral common iliac, external iliac and femoral arteries are patent. RETROPERITONEUM/LYMPH NODES: No lymphadenopathy. ABDOMINAL WALL: Small right lateral fat-containing inguinal hernias.  In the past: Had on and off bilateral groin pain at night, resolved by morning.  Patient concerned about Prostate cancer.   Seen on 7/20/2023 for flank pain.  Had developed severe right flank pain and testis pain, next day went to [facility] emergency department. Had renal and Bladder Ultrasound and Scrotal Ultrasound. No longer had flank or testicular pain. No personal history or family history of kidney stone.  Had Constipation took Ayurvedic medicine. Had no change in urination.   Seen on 7/13/2023. On Silodosin 8 mg, daytime frequency 3 to 4 x and no nocturia.  Denied hesitancy, straining, intermittency, urgency, incontinence. sense of incomplete emptying and post void dribbling  Started with good flow and then tapers.   Seen on 2/16/23: Changed Alfuzosin to Silodosin.   Seen on 2/10/23. Urine culture (2/7/23): Proteus mirabilis. Changed to Augmentin suspension.  Seen on 2/7/23.  Restarted Alfuzosin. Started Augmentin for presumed urinary tract infection.   Seen on 7/15/21 for lower urinary tract symptoms.  Reported reasonable stream, urinates 3-4 x or so during the day. Nocturia of 0-1 x.  Endorsed off and on hesitancy and straining especially with Constipation.  Denied dysuria, hematuria, lower abdominal or flank pain, fever, chills or rigors.  Has off and on redness of penis.  Has been having bilateral lower extremity edema.  Saw Dr Dailey in the past.  Saw me at [facility] for Epididymo-orchitis.